# Patient Record
Sex: FEMALE | Race: WHITE | Employment: OTHER | URBAN - METROPOLITAN AREA
[De-identification: names, ages, dates, MRNs, and addresses within clinical notes are randomized per-mention and may not be internally consistent; named-entity substitution may affect disease eponyms.]

---

## 2021-05-17 ENCOUNTER — AMBULATORY - HEALTHEAST (OUTPATIENT)
Dept: GERIATRICS | Facility: CLINIC | Age: 76
End: 2021-05-17

## 2021-05-18 ENCOUNTER — OFFICE VISIT - HEALTHEAST (OUTPATIENT)
Dept: GERIATRICS | Facility: CLINIC | Age: 76
End: 2021-05-18

## 2021-05-18 ENCOUNTER — RECORDS - HEALTHEAST (OUTPATIENT)
Dept: LAB | Facility: CLINIC | Age: 76
End: 2021-05-18

## 2021-05-18 DIAGNOSIS — J96.01 ACUTE RESPIRATORY FAILURE WITH HYPOXIA (H): ICD-10-CM

## 2021-05-18 DIAGNOSIS — R33.9 URINARY RETENTION: ICD-10-CM

## 2021-05-18 DIAGNOSIS — E10.10 DIABETIC KETOACIDOSIS WITHOUT COMA ASSOCIATED WITH TYPE 1 DIABETES MELLITUS (H): ICD-10-CM

## 2021-05-18 DIAGNOSIS — N17.9 ACUTE KIDNEY INJURY (H): ICD-10-CM

## 2021-05-18 DIAGNOSIS — J69.0 ASPIRATION PNEUMONIA, UNSPECIFIED ASPIRATION PNEUMONIA TYPE, UNSPECIFIED LATERALITY, UNSPECIFIED PART OF LUNG (H): ICD-10-CM

## 2021-05-18 DIAGNOSIS — E87.20 LACTIC ACIDOSIS: ICD-10-CM

## 2021-05-19 ENCOUNTER — COMMUNICATION - HEALTHEAST (OUTPATIENT)
Dept: GERIATRICS | Facility: CLINIC | Age: 76
End: 2021-05-19

## 2021-05-19 LAB
ANION GAP SERPL CALCULATED.3IONS-SCNC: 12 MMOL/L (ref 5–18)
BNP SERPL-MCNC: 572 PG/ML (ref 0–144)
BUN SERPL-MCNC: 26 MG/DL (ref 8–28)
CALCIUM SERPL-MCNC: 9.2 MG/DL (ref 8.5–10.5)
CHLORIDE BLD-SCNC: 103 MMOL/L (ref 98–107)
CO2 SERPL-SCNC: 27 MMOL/L (ref 22–31)
CREAT SERPL-MCNC: 1.44 MG/DL (ref 0.6–1.1)
ERYTHROCYTE [DISTWIDTH] IN BLOOD BY AUTOMATED COUNT: 14.2 % (ref 11–14.5)
GFR SERPL CREATININE-BSD FRML MDRD: 35 ML/MIN/1.73M2
GLUCOSE BLD-MCNC: 146 MG/DL (ref 70–125)
HCT VFR BLD AUTO: 33.6 % (ref 35–47)
HGB BLD-MCNC: 10.7 G/DL (ref 12–16)
MCH RBC QN AUTO: 30.8 PG (ref 27–34)
MCHC RBC AUTO-ENTMCNC: 31.8 G/DL (ref 32–36)
MCV RBC AUTO: 97 FL (ref 80–100)
PLATELET # BLD AUTO: 316 THOU/UL (ref 140–440)
PMV BLD AUTO: 11.8 FL (ref 8.5–12.5)
POTASSIUM BLD-SCNC: 4.2 MMOL/L (ref 3.5–5)
RBC # BLD AUTO: 3.47 MILL/UL (ref 3.8–5.4)
SODIUM SERPL-SCNC: 142 MMOL/L (ref 136–145)
WBC: 9.5 THOU/UL (ref 4–11)

## 2021-05-21 ENCOUNTER — RECORDS - HEALTHEAST (OUTPATIENT)
Dept: LAB | Facility: CLINIC | Age: 76
End: 2021-05-21

## 2021-05-21 ENCOUNTER — OFFICE VISIT - HEALTHEAST (OUTPATIENT)
Dept: GERIATRICS | Facility: CLINIC | Age: 76
End: 2021-05-21

## 2021-05-21 DIAGNOSIS — N17.9 ACUTE KIDNEY INJURY (H): ICD-10-CM

## 2021-05-21 DIAGNOSIS — J96.01 ACUTE RESPIRATORY FAILURE WITH HYPOXIA (H): ICD-10-CM

## 2021-05-21 DIAGNOSIS — J69.0 ASPIRATION PNEUMONIA, UNSPECIFIED ASPIRATION PNEUMONIA TYPE, UNSPECIFIED LATERALITY, UNSPECIFIED PART OF LUNG (H): ICD-10-CM

## 2021-05-21 DIAGNOSIS — E87.20 LACTIC ACIDOSIS: ICD-10-CM

## 2021-05-21 DIAGNOSIS — E10.10 DIABETIC KETOACIDOSIS WITHOUT COMA ASSOCIATED WITH TYPE 1 DIABETES MELLITUS (H): ICD-10-CM

## 2021-05-21 LAB
C DIFF TOX B STL QL: POSITIVE
RIBOTYPE 027/NAP1/BI: ABNORMAL

## 2021-05-23 ENCOUNTER — RECORDS - HEALTHEAST (OUTPATIENT)
Dept: LAB | Facility: CLINIC | Age: 76
End: 2021-05-23

## 2021-05-24 LAB
ANION GAP SERPL CALCULATED.3IONS-SCNC: 11 MMOL/L (ref 5–18)
BNP SERPL-MCNC: 549 PG/ML (ref 0–144)
BUN SERPL-MCNC: 23 MG/DL (ref 8–28)
CALCIUM SERPL-MCNC: 8.4 MG/DL (ref 8.5–10.5)
CHLORIDE BLD-SCNC: 109 MMOL/L (ref 98–107)
CO2 SERPL-SCNC: 23 MMOL/L (ref 22–31)
CREAT SERPL-MCNC: 1.23 MG/DL (ref 0.6–1.1)
GFR SERPL CREATININE-BSD FRML MDRD: 42 ML/MIN/1.73M2
GLUCOSE BLD-MCNC: 104 MG/DL (ref 70–125)
POTASSIUM BLD-SCNC: 5.1 MMOL/L (ref 3.5–5)
SODIUM SERPL-SCNC: 143 MMOL/L (ref 136–145)

## 2021-05-25 ENCOUNTER — OFFICE VISIT - HEALTHEAST (OUTPATIENT)
Dept: GERIATRICS | Facility: CLINIC | Age: 76
End: 2021-05-25

## 2021-05-25 DIAGNOSIS — A09 DIARRHEA OF INFECTIOUS ORIGIN: ICD-10-CM

## 2021-05-25 DIAGNOSIS — R53.81 PHYSICAL DECONDITIONING: ICD-10-CM

## 2021-05-25 DIAGNOSIS — E13.69 OTHER SPECIFIED DIABETES MELLITUS WITH OTHER SPECIFIED COMPLICATION, WITHOUT LONG-TERM CURRENT USE OF INSULIN (H): ICD-10-CM

## 2021-05-26 ENCOUNTER — RECORDS - HEALTHEAST (OUTPATIENT)
Dept: LAB | Facility: CLINIC | Age: 76
End: 2021-05-26

## 2021-05-27 ENCOUNTER — AMBULATORY - HEALTHEAST (OUTPATIENT)
Dept: GERIATRICS | Facility: CLINIC | Age: 76
End: 2021-05-27

## 2021-05-27 ENCOUNTER — OFFICE VISIT - HEALTHEAST (OUTPATIENT)
Dept: GERIATRICS | Facility: CLINIC | Age: 76
End: 2021-05-27

## 2021-05-27 VITALS
OXYGEN SATURATION: 96 % | HEART RATE: 62 BPM | TEMPERATURE: 97.3 F | SYSTOLIC BLOOD PRESSURE: 141 MMHG | RESPIRATION RATE: 12 BRPM | DIASTOLIC BLOOD PRESSURE: 44 MMHG

## 2021-05-27 DIAGNOSIS — E13.69 OTHER SPECIFIED DIABETES MELLITUS WITH OTHER SPECIFIED COMPLICATION, WITHOUT LONG-TERM CURRENT USE OF INSULIN (H): ICD-10-CM

## 2021-05-27 DIAGNOSIS — R63.5 WEIGHT GAIN: ICD-10-CM

## 2021-05-27 DIAGNOSIS — R53.81 PHYSICAL DECONDITIONING: ICD-10-CM

## 2021-05-28 ENCOUNTER — RECORDS - HEALTHEAST (OUTPATIENT)
Dept: LAB | Facility: CLINIC | Age: 76
End: 2021-05-28

## 2021-05-28 ENCOUNTER — COMMUNICATION - HEALTHEAST (OUTPATIENT)
Dept: GERIATRICS | Facility: CLINIC | Age: 76
End: 2021-05-28

## 2021-05-28 LAB
HBA1C MFR BLD: 7.7 %
TSH SERPL DL<=0.005 MIU/L-ACNC: 2.76 UIU/ML (ref 0.3–5)

## 2021-05-29 LAB
ANION GAP SERPL CALCULATED.3IONS-SCNC: 11 MMOL/L (ref 5–18)
BUN SERPL-MCNC: 23 MG/DL (ref 8–28)
CALCIUM SERPL-MCNC: 9.2 MG/DL (ref 8.5–10.5)
CHLORIDE BLD-SCNC: 103 MMOL/L (ref 98–107)
CO2 SERPL-SCNC: 27 MMOL/L (ref 22–31)
CREAT SERPL-MCNC: 1.22 MG/DL (ref 0.6–1.1)
GFR SERPL CREATININE-BSD FRML MDRD: 43 ML/MIN/1.73M2
GLUCOSE BLD-MCNC: 105 MG/DL (ref 70–125)
POTASSIUM BLD-SCNC: 4.8 MMOL/L (ref 3.5–5)
SODIUM SERPL-SCNC: 141 MMOL/L (ref 136–145)

## 2021-06-01 ENCOUNTER — OFFICE VISIT - HEALTHEAST (OUTPATIENT)
Dept: GERIATRICS | Facility: CLINIC | Age: 76
End: 2021-06-01

## 2021-06-01 DIAGNOSIS — A09 DIARRHEA OF INFECTIOUS ORIGIN: ICD-10-CM

## 2021-06-01 DIAGNOSIS — E13.69 OTHER SPECIFIED DIABETES MELLITUS WITH OTHER SPECIFIED COMPLICATION, WITHOUT LONG-TERM CURRENT USE OF INSULIN (H): ICD-10-CM

## 2021-06-01 DIAGNOSIS — R53.81 PHYSICAL DECONDITIONING: ICD-10-CM

## 2021-06-03 ENCOUNTER — AMBULATORY - HEALTHEAST (OUTPATIENT)
Dept: GERIATRICS | Facility: CLINIC | Age: 76
End: 2021-06-03

## 2021-06-17 NOTE — PROGRESS NOTES
[facility]      Facility:    [facility] [[id number]]  Code Status: FULL CODE      Chief Complaint/Reason for Visit:  Chief Complaint   Patient presents with     H & P     Diabetes with diabetic ketoacidosis and lactic acidosis, acute kidney injury, possible upper GI bleed, acute systolic CHF with acute hypoxic respiratory failure, prolonged QTc interval, hyperlipidemia, urinary retention.       HPI:   Christi is a 75 y.o. female who is only admitted to the hospital on 5/7/2021 she had vomiting with coffee-ground emesis and she was brought in the hospital was found to have severely elevated blood sugars she does have type 1 diabetes.  She presented with diabetic ketoacidosis placed on insulin drip as well as given IV fluids.  She also had aspiration pneumonia in the hospital and sepsis due to that.  She did have new wall motion abnormalities on echocardiogram and depressed ejection fraction.  She denies any chest pain during her hospital stay.  Initial troponins were negative on admission and she did not experience any more chest pain in the hospital.  She was placed on insulin pump at this time and blood sugars did stabilize.  She did underwent diuresis with IV then switched to oral Lasix secondary to acute congestive heart failure and discharged on 20 mg of Lasix daily.  She was recommended follow-up with cardiology.  Patient was treated properly and transferred here to the TCU at [facility] in stable condition.    Patient is seen in her chair comfortably does not appear to be in acute distress.  She is legally deaf so I did communicate with her with lip reading which she did very well she understood everything I said and answered questions appropriately..  Insulin pump is working well at this time and her only complaint is she gets short of breath with any real activity.    Past Medical History:  Past Medical History:   Diagnosis Date     Acute MI (H)       CAD (coronary artery disease)      Chest pain 10/6/2017     Coronary artery disease      Deaf      Diabetes (H)      Dyslipidemia      HTN (hypertension)      Hypertension      Hypothyroidism      PDR (proliferative diabetic retinopathy) (H) 3/31/2014           Surgical History:  Past Surgical History:   Procedure Laterality Date     CARPAL TUNNEL RELEASE        SECTION       CORONARY STENT PLACEMENT       CORONARY STENT PLACEMENT         Family History:   Family History   Family history unknown: Yes       Social History:    Social History     Socioeconomic History     Marital status:      Spouse name: None     Number of children: None     Years of education: None     Highest education level: None   Occupational History     None   Social Needs     Financial resource strain: None     Food insecurity     Worry: None     Inability: None     Transportation needs     Medical: None     Non-medical: None   Tobacco Use     Smoking status: Former Smoker     Quit date: 1983     Years since quittin.4     Smokeless tobacco: Never Used   Substance and Sexual Activity     Alcohol use: Yes     Drug use: No     Sexual activity: None   Lifestyle     Physical activity     Days per week: None     Minutes per session: None     Stress: None   Relationships     Social connections     Talks on phone: None     Gets together: None     Attends Worship service: None     Active member of club or organization: None     Attends meetings of clubs or organizations: None     Relationship status: None     Intimate partner violence     Fear of current or ex partner: None     Emotionally abused: None     Physically abused: None     Forced sexual activity: None   Other Topics Concern     Incontinent Not Asked     Toileting independently Not Asked     Cognitive impairment Not Asked     Vision impairment Not Asked     Hearing impairment Not Asked     Dentures Not Asked   Social History Narrative     None           Review of Systems   Constitutional:        Positive for shortness of breath with exercise however she denies any fevers chills nausea vomit diarrhea change in vision hearing taste or smell weakness one-sided chest pain or tightness.  She denies any current shortness stool polyphagia polydipsia polyuria depression or anxiety the remainder review of systems is negative.       Vitals:    05/18/21 0955   BP: 141/44   Pulse: 62   Resp: 12   Temp: 97.3  F (36.3  C)   SpO2: 96%       Physical Exam  Constitutional:       General: She is not in acute distress.     Appearance: She is not toxic-appearing.   HENT:      Head: Normocephalic and atraumatic.      Nose: Nose normal.   Eyes:      General:         Right eye: No discharge.   Cardiovascular:      Rate and Rhythm: Normal rate and regular rhythm.   Pulmonary:      Effort: Pulmonary effort is normal. No respiratory distress.      Breath sounds: No wheezing or rales.   Abdominal:      General: Bowel sounds are normal. There is distension.      Tenderness: There is no abdominal tenderness.   Musculoskeletal:      Comments: Trace edema bilateral.   Skin:     General: Skin is warm and dry.   Neurological:      Mental Status: She is alert. Mental status is at baseline.   Psychiatric:         Mood and Affect: Mood normal.         Behavior: Behavior normal.         Medication List:  Current Outpatient Medications   Medication Sig     acetaminophen (TYLENOL) 325 MG tablet Take 650 mg by mouth every 6 (six) hours as needed for pain.     aspirin 81 mg chewable tablet Chew 81 mg daily.     atenolol (TENORMIN) 50 MG tablet Take 50 mg by mouth daily.     atorvastatin (LIPITOR) 80 MG tablet Take 80 mg by mouth at bedtime.     baclofen (LIORESAL) 10 MG tablet Take 10 mg by mouth daily as needed.     cholecalciferol, vitamin D3, 2,000 unit Tab Take 2,000 Units by mouth daily.     ezetimibe (ZETIA) 10 mg tablet Take 10 mg by mouth daily.     fluticasone propionate (FLONASE) 50  mcg/actuation nasal spray Apply 1 spray into each nostril daily.     furosemide (LASIX) 40 MG tablet Take 40 mg by mouth daily.     glucagon (BAQSIMI) 3 mg/actuation Spry 1 Dose into each nostril as needed (May repeat in 15 minutes prn. Do not open tube until ready to use).     insulin glargine (LANTUS) 100 unit/mL vial Inject 30 Units under the skin as needed (for if insulin fails).     insulin pump cartridge Crtg 1 each Inject under the skin continuous. Insulin type: Novolog     levothyroxine (SYNTHROID, LEVOTHROID) 137 MCG tablet Take 137 mcg by mouth every evening.     magnesium oxide (MAG-OX) 400 mg (241.3 mg magnesium) tablet Take 400 mg by mouth daily.     multivitamin with minerals (THERA-M) 9 mg iron-400 mcg Tab tablet Take 1 tablet by mouth daily.     omega 3-dha-epa-fish oil (FISH OIL) 1,000 mg (120 mg-180 mg) cap Take 1,000 mg by mouth daily.     omega 3-dha-epa-fish oil 1,200 (144-216) mg cap Take 1,200 mg by mouth daily.     omeprazole (PRILOSEC) 20 MG capsule Take 20 mg by mouth daily before breakfast.     polyethylene glycol (MIRALAX) 17 gram packet Take 17 g by mouth daily as needed.     polyvinyl alcohol (LIQUIFILM TEARS) 1.4 % ophthalmic solution Administer 1-2 drops to both eyes daily.     potassium bicarb-citric acid 20 mEq TbEF Take 1 tablet by mouth daily.     sertraline (ZOLOFT) 50 MG tablet Take 50 mg by mouth daily.     timoloL maleate (TIMOPTIC) 0.5 % ophthalmic solution Administer 1 drop into the left eye 2 (two) times a day.     traZODone (DESYREL) 100 MG tablet Take 100 mg by mouth at bedtime.        Labs: Blood sugars here are running high of 260 and low of 131.      Assessment:    ICD-10-CM    1. Diabetic ketoacidosis without coma associated with type 1 diabetes mellitus (H)  E10.10    2. Acute kidney injury (H)  N17.9    3. Aspiration pneumonia, unspecified aspiration pneumonia type, unspecified laterality, unspecified part of lung (H)  J69.0    4. Acute respiratory failure with  hypoxia (H)  J96.01    5. Lactic acidosis  E87.2    6. Urinary retention  R33.9        Plan: This time blood sugars were done 4 times daily basic metabolic profile CBC done tomorrow secondary to possible GI bleed that was mentioned in the hospital and basic metabolic profile tomorrow second acute kidney injury.  Will check weights daily notify the provider if any 2 pound weight changes.  We will also get brain atretic peptide tomorrow secondary to CHF and make sure she gets her trazodone 100 mg nightly for sleep.  Will incorporate speech therapy and PT OT at this time.  Monitor sleep we can always go up on her trazodone.  She did not know what dose she was on but like to 50 mg tablets were likely at home however if it was 100 mg she took 2 at home then we will I will be open to increase that to 200 mg of trazodone.    I will continue to monitor above medical problems and no other changes to care plan at this time.        Electronically signed by: Cj Pisano,

## 2021-06-17 NOTE — PROGRESS NOTES
[facility]    Facility:   [facility] [[id number]]   Code Status: FULL CODE      CHIEF COMPLAINT/REASON FOR VISIT:  Chief Complaint   Patient presents with     Problem Visit     Diabetes with diabetic ketoacidosis lactic acidosis, acute kidney injury, acute systolic CHF, prolonged QT interval, hyperlipidemia, urinary retention.       HISTORY:      HPI: Christi is a 75 y.o. female who resides here at the [facility] undergoing physical and occupational therapy and medical management secondary to hospitalization for diabetes with diabetic ketoacidosis lactic acidosis.  She also acute kidney injury in the hospital and did have an elevated brain atretic peptide.  We are reviewing her weights she was admitted at 191 now she is down to 189.6.  Creatinine is up to 144 with a GFR in the 30s.  I am unclear what her baseline is at this time.  She is no longer in ketoacidosis at this time and she does have aspiration pneumonia working with speech therapy.    Patient overall is doing well as far as her blood sugars and ketoacidosis going however she does have diarrhea and she had since antibiotics.  She also has hemorrhoids she also has some nausea which is concerning at this time.  No longer on antibiotics at this time.  She is not in any Zofran.  She claims that the diarrhea is been happening for some time likely from the antibiotics she feels however she is not been tested for C. difficile.  She does have some abdominal discomfort she is a minimizer.  She also has her hemorrhoids are acting up at this time.  She denies any urgency frequency or burning with urination chest pain or shortness of breath.    Past Medical History:   Diagnosis Date     Acute MI (H) 2005     CAD (coronary artery disease)      Chest pain 10/6/2017     Coronary artery disease      Deaf      Diabetes (H)      Dyslipidemia      HTN (hypertension)      Hypertension      Hypothyroidism      PDR  (proliferative diabetic retinopathy) (H) 3/31/2014             Family History   Family history unknown: Yes     Social History     Socioeconomic History     Marital status:      Spouse name: None     Number of children: None     Years of education: None     Highest education level: None   Occupational History     None   Social Needs     Financial resource strain: None     Food insecurity     Worry: None     Inability: None     Transportation needs     Medical: None     Non-medical: None   Tobacco Use     Smoking status: Former Smoker     Quit date: 1983     Years since quittin.4     Smokeless tobacco: Never Used   Substance and Sexual Activity     Alcohol use: Yes     Drug use: No     Sexual activity: None   Lifestyle     Physical activity     Days per week: None     Minutes per session: None     Stress: None   Relationships     Social connections     Talks on phone: None     Gets together: None     Attends Muslim service: None     Active member of club or organization: None     Attends meetings of clubs or organizations: None     Relationship status: None     Intimate partner violence     Fear of current or ex partner: None     Emotionally abused: None     Physically abused: None     Forced sexual activity: None   Other Topics Concern     Incontinent Not Asked     Toileting independently Not Asked     Cognitive impairment Not Asked     Vision impairment Not Asked     Hearing impairment Not Asked     Dentures Not Asked   Social History Narrative     None         Review of Systems   Constitutional:        Positive for abdominal discomfort and some nausea.  She also claims that she has had diarrhea for some time at this time and likely secondary to antibiotics.  She had been on antibiotics.  She denies any fevers chills vomiting chest pain or shortness of breath.  There is no depression or anxiety change in vision hearing taste or smell weakness one-sided of the.  Remainder review of systems is  negative.       Vitals:    05/21/21 0919   BP: 125/46   Pulse: (!) 50   Resp: 16   Temp: 97.1  F (36.2  C)   SpO2: 96%       Physical Exam  Constitutional:       General: She is not in acute distress.  HENT:      Head: Normocephalic and atraumatic.      Nose: Nose normal.      Mouth/Throat:      Mouth: Mucous membranes are moist.      Pharynx: Oropharynx is clear.   Eyes:      General:         Right eye: No discharge.         Left eye: No discharge.   Cardiovascular:      Rate and Rhythm: Normal rate.   Pulmonary:      Effort: Pulmonary effort is normal. No respiratory distress.      Breath sounds: No wheezing.   Abdominal:      General: There is distension.      Comments: There is slight tenderness to the epigastric area but no rebound or guarding.  Bowel sounds are positive.   Musculoskeletal:      Right lower leg: No edema.      Left lower leg: No edema.   Neurological:      Mental Status: She is alert. Mental status is at baseline.   Psychiatric:         Mood and Affect: Mood normal.         Behavior: Behavior normal.           LABS: Natruretic peptide was 570, basic metabolic profile is as follows sodium 142, potassium 4.2, chloride 103, CO2 is 27, Melecio 146, calcium was 9.2, BUN is 26, creatinine 1.44, GFR was 35.      ASSESSMENT:      ICD-10-CM    1. Diabetic ketoacidosis without coma associated with type 1 diabetes mellitus (H)  E10.10    2. Lactic acidosis  E87.2    3. Acute kidney injury (H)  N17.9    4. Aspiration pneumonia, unspecified aspiration pneumonia type, unspecified laterality, unspecified part of lung (H)  J69.0    5. Acute respiratory failure with hypoxia (H)  J96.01        PLAN: At this time brain injury peptide basic metabolic profile be done Monday showed acute kidney injury and CHF.    We will start Anusol HC cream twice daily to hemorrhoids check and check stools for C. difficile secondary to diarrhea.  Will get abdominal film flat and upright second abdominal pain and diarrhea and will  Zofran 4 mg 1 p.o. every 8 hours as needed for nausea.    I will continue to monitor above medical problems and no other changes to care plan at this time.  Total  minutes of which % was spent counseling and coordination of care of the above plan.    Electronically signed by: Cj Pisano DO

## 2021-06-17 NOTE — TELEPHONE ENCOUNTER
[facility] Nurse Triage Telephone Note      Provider: Cj Pisano DO  Facility: [facility]    Facility Type: TCU    Caller: Denise  Call Back Number:  [phone]    Allergies: Lisinopril, Metformin, and Metoprolol    Reason for call: Nurse calling to report Heme 2, BMP, and BNP results.  Notable meds:  ASA 81mg daily, Atenolol 50mg daily, Effer K 20meq daily, Lasix 20mg daily, Irbesartan 300mg daily, Omeprazole 20mg daily.       Verbal Order/Direction given by Provider: No new orders.      Provider giving order: Cj Pisano DO    Verbal order given to: Denise Canales RN

## 2021-06-17 NOTE — PROGRESS NOTES
[facility]    Facility:   [facility] [[id number]]   Code Status: FULL CODE      CHIEF COMPLAINT/REASON FOR VISIT:  Chief Complaint   Patient presents with     Review Of Multiple Medical Conditions     review VS, labs F/U diabetic ketoacidosis establish care        HISTORY:      HPI: Christi is a 75 y.o. female undergoing physical and  occupational therapy at [facility].  She is with past medical history hypertension, diabetes type 1, has an insulin pump, CAD, hypothyroidism, who presented to the hospital on 5/7/2021 with coffee-ground emesis and found to be in diabetic ketoacidosis.  She was given IV insulin along with fluids.  She was also found to have aspiration pneumonia with sepsis she has completed antibiotics.  She was also treated with IV Lasix and transition to oral Lasix secondary to congestive heart failure with recommendations to follow-up with cardiology.  BMP on 5/24/2021 was 549 which is down from 572.  Her creatinine is improving.  And last hemoglobin 10.7 on 5/19/2021.    Today she is seen to review VS, labs. She denied CP, She does have shortness of breath with activity, Denied constipation, diarrhea, nausea, cough or congestion. She does have an insulin pump. She checks her sugars 4-6 times a day and reports the staff is also checking four times a day  with their monitor.  She is currently completing a course of vancomycin for C. difficile.    Past Medical History:   Diagnosis Date     Acute MI (H) 2005     CAD (coronary artery disease)      Chest pain 10/6/2017     Coronary artery disease      Deaf      Diabetes (H)      Dyslipidemia      HTN (hypertension)      Hypertension      Hypothyroidism      PDR (proliferative diabetic retinopathy) (H) 3/31/2014             Family History   Family history unknown: Yes     Social History     Socioeconomic History     Marital status:      Spouse name: Not on file     Number of children: Not on  file     Years of education: Not on file     Highest education level: Not on file   Occupational History     Not on file   Social Needs     Financial resource strain: Not on file     Food insecurity     Worry: Not on file     Inability: Not on file     Transportation needs     Medical: Not on file     Non-medical: Not on file   Tobacco Use     Smoking status: Former Smoker     Quit date: 1983     Years since quittin.4     Smokeless tobacco: Never Used   Substance and Sexual Activity     Alcohol use: Yes     Drug use: No     Sexual activity: Not on file   Lifestyle     Physical activity     Days per week: Not on file     Minutes per session: Not on file     Stress: Not on file   Relationships     Social connections     Talks on phone: Not on file     Gets together: Not on file     Attends Faith service: Not on file     Active member of club or organization: Not on file     Attends meetings of clubs or organizations: Not on file     Relationship status: Not on file     Intimate partner violence     Fear of current or ex partner: Not on file     Emotionally abused: Not on file     Physically abused: Not on file     Forced sexual activity: Not on file   Other Topics Concern     Incontinent Not Asked     Toileting independently Not Asked     Cognitive impairment Not Asked     Vision impairment Not Asked     Hearing impairment Not Asked     Dentures Not Asked   Social History Narrative     Not on file         Review of Systems   Constitutional: Negative for activity change, appetite change, fatigue and fever.   HENT: Negative for congestion.         Deaf- uses a whiteboard    Respiratory: Negative for cough, shortness of breath and wheezing.    Cardiovascular: Negative for chest pain and leg swelling.   Gastrointestinal: Negative for abdominal distention, abdominal pain, constipation, diarrhea and nausea.   Endocrine:        Pt on an insulin pump    Genitourinary: Negative for dysuria.   Musculoskeletal:  Negative for arthralgias and back pain.   Skin: Negative for color change and wound.   Neurological: Negative for dizziness.   Psychiatric/Behavioral: Negative for agitation, behavioral problems and confusion.       Vitals:    05/25/21 0931   BP: 146/51   Pulse: 60   Resp: 16   Temp: 97.2  F (36.2  C)   SpO2: 95%   Weight: 181 lb 9.6 oz (82.4 kg)       Physical Exam  Constitutional:       Appearance: She is well-developed.      Comments: Pleasant woman in no acute distress    HENT:      Head: Normocephalic.   Eyes:      Conjunctiva/sclera: Conjunctivae normal.   Neck:      Musculoskeletal: Normal range of motion.   Cardiovascular:      Rate and Rhythm: Normal rate and regular rhythm.      Heart sounds: Normal heart sounds. No murmur.   Pulmonary:      Effort: No respiratory distress.      Breath sounds: Normal breath sounds. No wheezing or rales.   Abdominal:      General: Bowel sounds are normal. There is no distension.      Palpations: Abdomen is soft.      Tenderness: There is no abdominal tenderness.   Musculoskeletal: Normal range of motion.   Skin:     General: Skin is warm.   Neurological:      Mental Status: She is alert and oriented to person, place, and time.   Psychiatric:         Behavior: Behavior normal.           LABS:   Recent Results (from the past 240 hour(s))   Basic Metabolic Panel   Result Value Ref Range    Sodium 142 136 - 145 mmol/L    Potassium 4.2 3.5 - 5.0 mmol/L    Chloride 103 98 - 107 mmol/L    CO2 27 22 - 31 mmol/L    Anion Gap, Calculation 12 5 - 18 mmol/L    Glucose 146 (H) 70 - 125 mg/dL    Calcium 9.2 8.5 - 10.5 mg/dL    BUN 26 8 - 28 mg/dL    Creatinine 1.44 (H) 0.60 - 1.10 mg/dL    GFR MDRD Af Amer 43 (L) >60 mL/min/1.73m2    GFR MDRD Non Af Amer 35 (L) >60 mL/min/1.73m2   HM2(CBC w/o Differential)   Result Value Ref Range    WBC 9.5 4.0 - 11.0 thou/uL    RBC 3.47 (L) 3.80 - 5.40 mill/uL    Hemoglobin 10.7 (L) 12.0 - 16.0 g/dL    Hematocrit 33.6 (L) 35.0 - 47.0 %    MCV 97 80 -  100 fL    MCH 30.8 27.0 - 34.0 pg    MCHC 31.8 (L) 32.0 - 36.0 g/dL    RDW 14.2 11.0 - 14.5 %    Platelets 316 140 - 440 thou/uL    MPV 11.8 8.5 - 12.5 fL   BNP(B-type Natriuretic Peptide)   Result Value Ref Range     (H) 0 - 144 pg/mL   C. Diff Toxin By PCR    Specimen: Stool   Result Value Ref Range    C.Difficile Toxigenic by PCR Positive (!) Negative    Ribotype 027/NAP1/B1 Presumptive Negative Presumptive Negative   Basic Metabolic Panel   Result Value Ref Range    Sodium 143 136 - 145 mmol/L    Potassium 5.1 (H) 3.5 - 5.0 mmol/L    Chloride 109 (H) 98 - 107 mmol/L    CO2 23 22 - 31 mmol/L    Anion Gap, Calculation 11 5 - 18 mmol/L    Glucose 104 70 - 125 mg/dL    Calcium 8.4 (L) 8.5 - 10.5 mg/dL    BUN 23 8 - 28 mg/dL    Creatinine 1.23 (H) 0.60 - 1.10 mg/dL    GFR MDRD Af Amer 51 (L) >60 mL/min/1.73m2    GFR MDRD Non Af Amer 42 (L) >60 mL/min/1.73m2   BNP(B-type Natriuretic Peptide)   Result Value Ref Range     (H) 0 - 144 pg/mL     Current Outpatient Medications   Medication Sig     acetaminophen (TYLENOL) 325 MG tablet Take 650 mg by mouth every 6 (six) hours as needed for pain.     aspirin 81 mg chewable tablet Chew 81 mg daily.     atenolol (TENORMIN) 50 MG tablet Take 50 mg by mouth daily.     atorvastatin (LIPITOR) 80 MG tablet Take 80 mg by mouth at bedtime.     baclofen (LIORESAL) 10 MG tablet Take 10 mg by mouth daily as needed.     cholecalciferol, vitamin D3, 2,000 unit Tab Take 2,000 Units by mouth daily.     ezetimibe (ZETIA) 10 mg tablet Take 10 mg by mouth daily.     fluticasone propionate (FLONASE) 50 mcg/actuation nasal spray Apply 1 spray into each nostril daily.     furosemide (LASIX) 40 MG tablet Take 40 mg by mouth daily.     glucagon (BAQSIMI) 3 mg/actuation Spry 1 Dose into each nostril as needed (May repeat in 15 minutes prn. Do not open tube until ready to use).     hydrocortisone (ANUSOL-HC) 2.5 % rectal cream Insert into the rectum 2 (two) times a day.     insulin  glargine (LANTUS) 100 unit/mL vial Inject 30 Units under the skin as needed (for if insulin fails).     insulin pump cartridge Crtg 1 each Inject under the skin continuous. Insulin type: Novolog     levothyroxine (SYNTHROID, LEVOTHROID) 137 MCG tablet Take 137 mcg by mouth every evening.     magnesium oxide (MAG-OX) 400 mg (241.3 mg magnesium) tablet Take 400 mg by mouth daily.     multivitamin with minerals (THERA-M) 9 mg iron-400 mcg Tab tablet Take 1 tablet by mouth daily.     omega 3-dha-epa-fish oil (FISH OIL) 1,000 mg (120 mg-180 mg) cap Take 1,000 mg by mouth daily.     omega 3-dha-epa-fish oil 1,200 (144-216) mg cap Take 1,200 mg by mouth daily.     omeprazole (PRILOSEC) 20 MG capsule Take 20 mg by mouth daily before breakfast.     ondansetron (ZOFRAN) 4 MG tablet Take 4 mg by mouth every 8 (eight) hours as needed for nausea.     polyethylene glycol (MIRALAX) 17 gram packet Take 17 g by mouth daily as needed.     polyvinyl alcohol (LIQUIFILM TEARS) 1.4 % ophthalmic solution Administer 1-2 drops to both eyes daily.     potassium bicarb-citric acid 20 mEq TbEF Take 1 tablet by mouth daily.     sertraline (ZOLOFT) 50 MG tablet Take 50 mg by mouth daily.     timoloL maleate (TIMOPTIC) 0.5 % ophthalmic solution Administer 1 drop into the left eye 2 (two) times a day.     traZODone (DESYREL) 100 MG tablet Take 100 mg by mouth at bedtime.      vancomycin (VANCOCIN) 125 MG capsule Take 125 mg by mouth 4 (four) times a day.       ASSESSMENT:      ICD-10-CM    1. Other specified diabetes mellitus with other specified complication, without long-term current use of insulin (H)  E13.69    2. Diarrhea of infectious origin  A09    3. Physical deconditioning  R53.81        PLAN:    C- diff - on Vancomycin until     Insomnia- continue Trazodone    Depression on Zoloft    Diabetes- pt on an insulin pump  AIC pending     GERD continue Omeprazole     CAD- Zetia and Lipitor    Hypothyroidism- On Synthroid. Lab pending      Hypertension  on Atenolol     Electronically signed by: Amber Crenshaw, CNP

## 2021-06-21 NOTE — LETTER
Letter by Cj Pisano DO at      Author: Cj Pisano DO Service: -- Author Type: --    Filed:  Encounter Date: 5/21/2021 Status: (Other)         Patient: Christi Vazquez   MR Number: [id number]   YOB: 1945   Date of Visit: 5/21/2021     [facility]    Facility:   [facility] [[id number]]   Code Status: FULL CODE      CHIEF COMPLAINT/REASON FOR VISIT:  Chief Complaint   Patient presents with   ? Problem Visit     Diabetes with diabetic ketoacidosis lactic acidosis, acute kidney injury, acute systolic CHF, prolonged QT interval, hyperlipidemia, urinary retention.       HISTORY:      HPI: Christi is a 75 y.o. female who resides [facility] at [facility] undergoing physical and occupational therapy and medical management secondary to hospitalization for diabetes with diabetic ketoacidosis lactic acidosis.  She also acute kidney injury in the hospital and did have an elevated brain atretic peptide.  We are reviewing her weights she was admitted at 191 now she is down to 189.6.  Creatinine is up to 144 with a GFR in the 30s.  I am unclear what her baseline is at this time.  She is no longer in ketoacidosis at this time and she does have aspiration pneumonia working with speech therapy.    Patient overall is doing well as far as her blood sugars and ketoacidosis going however she does have diarrhea and she had since antibiotics.  She also has hemorrhoids she also has some nausea which is concerning at this time.  No longer on antibiotics at this time.  She is not in any Zofran.  She claims that the diarrhea is been happening for some time likely from the antibiotics she feels however she is not been tested for C. difficile.  She does have some abdominal discomfort she is a minimizer.  She also has her hemorrhoids are acting up at this time.  She denies any urgency frequency or burning with urination chest pain or shortness of breath.    Past  Medical History:   Diagnosis Date   ? Acute MI (H)    ? CAD (coronary artery disease)    ? Chest pain 10/6/2017   ? Coronary artery disease    ? Deaf    ? Diabetes (H)    ? Dyslipidemia    ? HTN (hypertension)    ? Hypertension    ? Hypothyroidism    ? PDR (proliferative diabetic retinopathy) (H) 3/31/2014             Family History   Family history unknown: Yes     Social History     Socioeconomic History   ? Marital status:      Spouse name: None   ? Number of children: None   ? Years of education: None   ? Highest education level: None   Occupational History   ? None   Social Needs   ? Financial resource strain: None   ? Food insecurity     Worry: None     Inability: None   ? Transportation needs     Medical: None     Non-medical: None   Tobacco Use   ? Smoking status: Former Smoker     Quit date: 1983     Years since quittin.4   ? Smokeless tobacco: Never Used   Substance and Sexual Activity   ? Alcohol use: Yes   ? Drug use: No   ? Sexual activity: None   Lifestyle   ? Physical activity     Days per week: None     Minutes per session: None   ? Stress: None   Relationships   ? Social connections     Talks on phone: None     Gets together: None     Attends Sabianism service: None     Active member of club or organization: None     Attends meetings of clubs or organizations: None     Relationship status: None   ? Intimate partner violence     Fear of current or ex partner: None     Emotionally abused: None     Physically abused: None     Forced sexual activity: None   Other Topics Concern   ? Incontinent Not Asked   ? Toileting independently Not Asked   ? Cognitive impairment Not Asked   ? Vision impairment Not Asked   ? Hearing impairment Not Asked   ? Dentures Not Asked   Social History Narrative   ? None         Review of Systems   Constitutional:        Positive for abdominal discomfort and some nausea.  She also claims that she has had diarrhea for some time at this time and likely  secondary to antibiotics.  She had been on antibiotics.  She denies any fevers chills vomiting chest pain or shortness of breath.  There is no depression or anxiety change in vision hearing taste or smell weakness one-sided of the.  Remainder review of systems is negative.       Vitals:    05/21/21 0919   BP: 125/46   Pulse: (!) 50   Resp: 16   Temp: 97.1  F (36.2  C)   SpO2: 96%       Physical Exam  Constitutional:       General: She is not in acute distress.  HENT:      Head: Normocephalic and atraumatic.      Nose: Nose normal.      Mouth/Throat:      Mouth: Mucous membranes are moist.      Pharynx: Oropharynx is clear.   Eyes:      General:         Right eye: No discharge.         Left eye: No discharge.   Cardiovascular:      Rate and Rhythm: Normal rate.   Pulmonary:      Effort: Pulmonary effort is normal. No respiratory distress.      Breath sounds: No wheezing.   Abdominal:      General: There is distension.      Comments: There is slight tenderness to the epigastric area but no rebound or guarding.  Bowel sounds are positive.   Musculoskeletal:      Right lower leg: No edema.      Left lower leg: No edema.   Neurological:      Mental Status: She is alert. Mental status is at baseline.   Psychiatric:         Mood and Affect: Mood normal.         Behavior: Behavior normal.           LABS: Natruretic peptide was 570, basic metabolic profile is as follows sodium 142, potassium 4.2, chloride 103, CO2 is 27, Melecio 146, calcium was 9.2, BUN is 26, creatinine 1.44, GFR was 35.      ASSESSMENT:      ICD-10-CM    1. Diabetic ketoacidosis without coma associated with type 1 diabetes mellitus (H)  E10.10    2. Lactic acidosis  E87.2    3. Acute kidney injury (H)  N17.9    4. Aspiration pneumonia, unspecified aspiration pneumonia type, unspecified laterality, unspecified part of lung (H)  J69.0    5. Acute respiratory failure with hypoxia (H)  J96.01        PLAN: At this time brain injury peptide basic metabolic  profile be done Monday showed acute kidney injury and CHF.    We will start Anusol HC cream twice daily to hemorrhoids check and check stools for C. difficile secondary to diarrhea.  Will get abdominal film flat and upright second abdominal pain and diarrhea and will Zofran 4 mg 1 p.o. every 8 hours as needed for nausea.    I will continue to monitor above medical problems and no other changes to care plan at this time.  Total  minutes of which % was spent counseling and coordination of care of the above plan.    Electronically signed by: Cj Pisano DO

## 2021-06-21 NOTE — LETTER
Letter by Cj Pisano DO at      Author: Cj Pisano DO Service: -- Author Type: --    Filed:  Encounter Date: 5/18/2021 Status: (Other)         Patient: Christi Vazquez   MR Number: [id number]   YOB: 1945   Date of Visit: 5/18/2021     [facility]      Facility:    [facility] [[id number]]  Code Status: FULL CODE      Chief Complaint/Reason for Visit:  Chief Complaint   Patient presents with   ? H & P     Diabetes with diabetic ketoacidosis and lactic acidosis, acute kidney injury, possible upper GI bleed, acute systolic CHF with acute hypoxic respiratory failure, prolonged QTc interval, hyperlipidemia, urinary retention.       HPI:   Christi is a 75 y.o. female who is only admitted to the hospital on 5/7/2021 she had vomiting with coffee-ground emesis and she was brought in the hospital was found to have severely elevated blood sugars she does have type 1 diabetes.  She presented with diabetic ketoacidosis placed on insulin drip as well as given IV fluids.  She also had aspiration pneumonia in the hospital and sepsis due to that.  She did have new wall motion abnormalities on echocardiogram and depressed ejection fraction.  She denies any chest pain during her hospital stay.  Initial troponins were negative on admission and she did not experience any more chest pain in the hospital.  She was placed on insulin pump at this time and blood sugars did stabilize.  She did underwent diuresis with IV then switched to oral Lasix secondary to acute congestive heart failure and discharged on 20 mg of Lasix daily.  She was recommended follow-up with cardiology.  Patient was treated properly and transferred here to the TCU at [facility] in stable condition.    Patient is seen in her chair comfortably does not appear to be in acute distress.  She is legally deaf so I did communicate with her with lip reading which she did very well she  understood everything I said and answered questions appropriately..  Insulin pump is working well at this time and her only complaint is she gets short of breath with any real activity.    Past Medical History:  Past Medical History:   Diagnosis Date   ? Acute MI (H)    ? CAD (coronary artery disease)    ? Chest pain 10/6/2017   ? Coronary artery disease    ? Deaf    ? Diabetes (H)    ? Dyslipidemia    ? HTN (hypertension)    ? Hypertension    ? Hypothyroidism    ? PDR (proliferative diabetic retinopathy) (H) 3/31/2014           Surgical History:  Past Surgical History:   Procedure Laterality Date   ? CARPAL TUNNEL RELEASE     ?  SECTION     ? CORONARY STENT PLACEMENT     ? CORONARY STENT PLACEMENT         Family History:   Family History   Family history unknown: Yes       Social History:    Social History     Socioeconomic History   ? Marital status:      Spouse name: None   ? Number of children: None   ? Years of education: None   ? Highest education level: None   Occupational History   ? None   Social Needs   ? Financial resource strain: None   ? Food insecurity     Worry: None     Inability: None   ? Transportation needs     Medical: None     Non-medical: None   Tobacco Use   ? Smoking status: Former Smoker     Quit date: 1983     Years since quittin.4   ? Smokeless tobacco: Never Used   Substance and Sexual Activity   ? Alcohol use: Yes   ? Drug use: No   ? Sexual activity: None   Lifestyle   ? Physical activity     Days per week: None     Minutes per session: None   ? Stress: None   Relationships   ? Social connections     Talks on phone: None     Gets together: None     Attends Je[employer]'s witness service: None     Active member of club or organization: None     Attends meetings of clubs or organizations: None     Relationship status: None   ? Intimate partner violence     Fear of current or ex partner: None     Emotionally abused: None     Physically abused: None     Forced sexual  activity: None   Other Topics Concern   ? Incontinent Not Asked   ? Toileting independently Not Asked   ? Cognitive impairment Not Asked   ? Vision impairment Not Asked   ? Hearing impairment Not Asked   ? Dentures Not Asked   Social History Narrative   ? None          Review of Systems   Constitutional:        Positive for shortness of breath with exercise however she denies any fevers chills nausea vomit diarrhea change in vision hearing taste or smell weakness one-sided chest pain or tightness.  She denies any current shortness stool polyphagia polydipsia polyuria depression or anxiety the remainder review of systems is negative.       Vitals:    05/18/21 0955   BP: 141/44   Pulse: 62   Resp: 12   Temp: 97.3  F (36.3  C)   SpO2: 96%       Physical Exam  Constitutional:       General: She is not in acute distress.     Appearance: She is not toxic-appearing.   HENT:      Head: Normocephalic and atraumatic.      Nose: Nose normal.   Eyes:      General:         Right eye: No discharge.   Cardiovascular:      Rate and Rhythm: Normal rate and regular rhythm.   Pulmonary:      Effort: Pulmonary effort is normal. No respiratory distress.      Breath sounds: No wheezing or rales.   Abdominal:      General: Bowel sounds are normal. There is distension.      Tenderness: There is no abdominal tenderness.   Musculoskeletal:      Comments: Trace edema bilateral.   Skin:     General: Skin is warm and dry.   Neurological:      Mental Status: She is alert. Mental status is at baseline.   Psychiatric:         Mood and Affect: Mood normal.         Behavior: Behavior normal.         Medication List:  Current Outpatient Medications   Medication Sig   ? acetaminophen (TYLENOL) 325 MG tablet Take 650 mg by mouth every 6 (six) hours as needed for pain.   ? aspirin 81 mg chewable tablet Chew 81 mg daily.   ? atenolol (TENORMIN) 50 MG tablet Take 50 mg by mouth daily.   ? atorvastatin (LIPITOR) 80 MG tablet Take 80 mg by mouth at bedtime.    ? baclofen (LIORESAL) 10 MG tablet Take 10 mg by mouth daily as needed.   ? cholecalciferol, vitamin D3, 2,000 unit Tab Take 2,000 Units by mouth daily.   ? ezetimibe (ZETIA) 10 mg tablet Take 10 mg by mouth daily.   ? fluticasone propionate (FLONASE) 50 mcg/actuation nasal spray Apply 1 spray into each nostril daily.   ? furosemide (LASIX) 40 MG tablet Take 40 mg by mouth daily.   ? glucagon (BAQSIMI) 3 mg/actuation Spry 1 Dose into each nostril as needed (May repeat in 15 minutes prn. Do not open tube until ready to use).   ? insulin glargine (LANTUS) 100 unit/mL vial Inject 30 Units under the skin as needed (for if insulin fails).   ? insulin pump cartridge Crtg 1 each Inject under the skin continuous. Insulin type: Novolog   ? levothyroxine (SYNTHROID, LEVOTHROID) 137 MCG tablet Take 137 mcg by mouth every evening.   ? magnesium oxide (MAG-OX) 400 mg (241.3 mg magnesium) tablet Take 400 mg by mouth daily.   ? multivitamin with minerals (THERA-M) 9 mg iron-400 mcg Tab tablet Take 1 tablet by mouth daily.   ? omega 3-dha-epa-fish oil (FISH OIL) 1,000 mg (120 mg-180 mg) cap Take 1,000 mg by mouth daily.   ? omega 3-dha-epa-fish oil 1,200 (144-216) mg cap Take 1,200 mg by mouth daily.   ? omeprazole (PRILOSEC) 20 MG capsule Take 20 mg by mouth daily before breakfast.   ? polyethylene glycol (MIRALAX) 17 gram packet Take 17 g by mouth daily as needed.   ? polyvinyl alcohol (LIQUIFILM TEARS) 1.4 % ophthalmic solution Administer 1-2 drops to both eyes daily.   ? potassium bicarb-citric acid 20 mEq TbEF Take 1 tablet by mouth daily.   ? sertraline (ZOLOFT) 50 MG tablet Take 50 mg by mouth daily.   ? timoloL maleate (TIMOPTIC) 0.5 % ophthalmic solution Administer 1 drop into the left eye 2 (two) times a day.   ? traZODone (DESYREL) 100 MG tablet Take 100 mg by mouth at bedtime.        Labs: Blood sugars here are running high of 260 and low of 131.      Assessment:    ICD-10-CM    1. Diabetic ketoacidosis without coma  associated with type 1 diabetes mellitus (H)  E10.10    2. Acute kidney injury (H)  N17.9    3. Aspiration pneumonia, unspecified aspiration pneumonia type, unspecified laterality, unspecified part of lung (H)  J69.0    4. Acute respiratory failure with hypoxia (H)  J96.01    5. Lactic acidosis  E87.2    6. Urinary retention  R33.9        Plan: This time blood sugars were done 4 times daily basic metabolic profile CBC done tomorrow secondary to possible GI bleed that was mentioned in the hospital and basic metabolic profile tomorrow second acute kidney injury.  Will check weights daily notify the provider if any 2 pound weight changes.  We will also get brain atretic peptide tomorrow secondary to CHF and make sure she gets her trazodone 100 mg nightly for sleep.  Will incorporate speech therapy and PT OT at this time.  Monitor sleep we can always go up on her trazodone.  She did not know what dose she was on but like to 50 mg tablets were likely at home however if it was 100 mg she took 2 at home then we will I will be open to increase that to 200 mg of trazodone.    I will continue to monitor above medical problems and no other changes to care plan at this time.        Electronically signed by: Cj Pisano,

## 2021-06-25 NOTE — TELEPHONE ENCOUNTER
[facility] Nurse Triage Telephone Note      Provider: ESTER Galindo  Facility: [facility]    Facility Type: TCU    Caller: Denise  Call Back Number:  [phone]    Allergies: Lisinopril, Metformin, and Metoprolol    Reason for call: Nurse calling to report TSH results.  The HgbA1c is still pending.  Patient is on Synthroid 137mcg daily.       Verbal Order/Direction given by Provider: No new orders.      Provider giving order: Cj Pisano DO    Verbal order given to: Denise Canales RN

## 2021-06-25 NOTE — PROGRESS NOTES
[facility]    Facility:   [facility] [[id number]]   Code Status: FULL CODE      CHIEF COMPLAINT/REASON FOR VISIT:  Chief Complaint   Patient presents with     Review Of Multiple Medical Conditions     review VS, labs      Discharge Summary       HISTORY:      HPI: Christi is a 75 y.o. female undergoing physical and  occupational therapy at [facility].  She is with past medical history hypertension, diabetes type 1, has an insulin pump, CAD, hypothyroidism, who presented to the hospital on 5/7/2021 with coffee-ground emesis and found to be in diabetic ketoacidosis.  She was given IV insulin along with fluids.  She was also found to have aspiration pneumonia with sepsis she has completed antibiotics.  She was also treated with IV Lasix and transition to oral Lasix secondary to congestive heart failure with recommendations to follow-up with cardiology.  BMP on 5/24/2021 was 549 which is down from 572.  Her creatinine is improving.  And last hemoglobin 10.7 on 5/19/2021.    Today she is seen to review VS, weight  fingersticks and a face-to-face for discharge.  Patient will discharge to home on 6/2/2021 with current medications and treatments.  She will have PT OT speech home health aide and RN.. She denied CP, She does have shortness of breath with activity, Denied constipation, diarrhea, nausea, cough or congestion. She does have an insulin pump. She checks her sugars 4-6 times a day and reports the staff is also checking four times a day  with their monitor.  She completed vancomycin for C. difficile.  Her weights were reviewed and she is down 2 pounds over the last week.    Past Medical History:   Diagnosis Date     Acute MI (H) 2005     CAD (coronary artery disease)      Chest pain 10/6/2017     Coronary artery disease      Deaf      Diabetes (H)      Dyslipidemia      HTN (hypertension)      Hypertension      Hypothyroidism      PDR (proliferative diabetic  retinopathy) (H) 3/31/2014             Family History   Family history unknown: Yes     Social History     Socioeconomic History     Marital status:      Spouse name: Not on file     Number of children: Not on file     Years of education: Not on file     Highest education level: Not on file   Occupational History     Not on file   Social Needs     Financial resource strain: Not on file     Food insecurity     Worry: Not on file     Inability: Not on file     Transportation needs     Medical: Not on file     Non-medical: Not on file   Tobacco Use     Smoking status: Former Smoker     Quit date: 1983     Years since quittin.4     Smokeless tobacco: Never Used   Substance and Sexual Activity     Alcohol use: Yes     Drug use: No     Sexual activity: Not on file   Lifestyle     Physical activity     Days per week: Not on file     Minutes per session: Not on file     Stress: Not on file   Relationships     Social connections     Talks on phone: Not on file     Gets together: Not on file     Attends Buddhism service: Not on file     Active member of club or organization: Not on file     Attends meetings of clubs or organizations: Not on file     Relationship status: Not on file     Intimate partner violence     Fear of current or ex partner: Not on file     Emotionally abused: Not on file     Physically abused: Not on file     Forced sexual activity: Not on file   Other Topics Concern     Incontinent Not Asked     Toileting independently Not Asked     Cognitive impairment Not Asked     Vision impairment Not Asked     Hearing impairment Not Asked     Dentures Not Asked   Social History Narrative     Not on file         Review of Systems   Constitutional: Negative for activity change, appetite change, fatigue and fever.   HENT: Negative for congestion.         Deaf- uses a whiteboard    Respiratory: Negative for cough, shortness of breath and wheezing.    Cardiovascular: Negative for chest pain and leg  swelling.   Gastrointestinal: Negative for abdominal distention, abdominal pain, constipation, diarrhea and nausea.   Endocrine:        Pt on an insulin pump    Genitourinary: Negative for dysuria.   Musculoskeletal: Negative for arthralgias and back pain.   Skin: Negative for color change and wound.   Neurological: Negative for dizziness.   Psychiatric/Behavioral: Negative for agitation, behavioral problems and confusion.       Vitals:    06/01/21 0807   BP: 127/42   Pulse: 60   Resp: 16   Temp: 97.3  F (36.3  C)   SpO2: 97%   Weight: 179 lb 9.6 oz (81.5 kg)       Physical Exam  Constitutional:       Appearance: She is well-developed.      Comments: Pleasant woman in no acute distress    HENT:      Head: Normocephalic.   Eyes:      Conjunctiva/sclera: Conjunctivae normal.   Neck:      Musculoskeletal: Normal range of motion.   Cardiovascular:      Rate and Rhythm: Normal rate and regular rhythm.      Heart sounds: Normal heart sounds. No murmur.   Pulmonary:      Effort: No respiratory distress.      Breath sounds: Normal breath sounds. No wheezing or rales.   Abdominal:      General: Bowel sounds are normal. There is no distension.      Palpations: Abdomen is soft.      Tenderness: There is no abdominal tenderness.   Musculoskeletal: Normal range of motion.   Skin:     General: Skin is warm.   Neurological:      Mental Status: She is alert and oriented to person, place, and time.   Psychiatric:         Behavior: Behavior normal.           LABS:   Recent Results (from the past 240 hour(s))   Basic Metabolic Panel   Result Value Ref Range    Sodium 143 136 - 145 mmol/L    Potassium 5.1 (H) 3.5 - 5.0 mmol/L    Chloride 109 (H) 98 - 107 mmol/L    CO2 23 22 - 31 mmol/L    Anion Gap, Calculation 11 5 - 18 mmol/L    Glucose 104 70 - 125 mg/dL    Calcium 8.4 (L) 8.5 - 10.5 mg/dL    BUN 23 8 - 28 mg/dL    Creatinine 1.23 (H) 0.60 - 1.10 mg/dL    GFR MDRD Af Amer 51 (L) >60 mL/min/1.73m2    GFR MDRD Non Af Amer 42 (L) >60  mL/min/1.73m2   BNP(B-type Natriuretic Peptide)   Result Value Ref Range     (H) 0 - 144 pg/mL   Thyroid Stimulating Hormone (TSH)   Result Value Ref Range    TSH 2.76 0.30 - 5.00 uIU/mL   Glycosylated Hemoglobin A1c   Result Value Ref Range    Hemoglobin A1c 7.7 (H) <=5.6 %   Basic Metabolic Panel   Result Value Ref Range    Sodium 141 136 - 145 mmol/L    Potassium 4.8 3.5 - 5.0 mmol/L    Chloride 103 98 - 107 mmol/L    CO2 27 22 - 31 mmol/L    Anion Gap, Calculation 11 5 - 18 mmol/L    Glucose 105 70 - 125 mg/dL    Calcium 9.2 8.5 - 10.5 mg/dL    BUN 23 8 - 28 mg/dL    Creatinine 1.22 (H) 0.60 - 1.10 mg/dL    GFR MDRD Af Amer 52 (L) >60 mL/min/1.73m2    GFR MDRD Non Af Amer 43 (L) >60 mL/min/1.73m2     Current Outpatient Medications   Medication Sig     acetaminophen (TYLENOL) 325 MG tablet Take 650 mg by mouth every 6 (six) hours as needed for pain.     aspirin 81 mg chewable tablet Chew 81 mg daily.     atenolol (TENORMIN) 50 MG tablet Take 50 mg by mouth daily.     atorvastatin (LIPITOR) 80 MG tablet Take 80 mg by mouth at bedtime.     baclofen (LIORESAL) 10 MG tablet Take 10 mg by mouth daily as needed.     cholecalciferol, vitamin D3, 2,000 unit Tab Take 2,000 Units by mouth daily.     ezetimibe (ZETIA) 10 mg tablet Take 10 mg by mouth daily.     fluticasone propionate (FLONASE) 50 mcg/actuation nasal spray Apply 1 spray into each nostril daily.     furosemide (LASIX) 40 MG tablet Take 40 mg by mouth daily.     glucagon (BAQSIMI) 3 mg/actuation Spry 1 Dose into each nostril as needed (May repeat in 15 minutes prn. Do not open tube until ready to use).     hydrocortisone (ANUSOL-HC) 2.5 % rectal cream Insert into the rectum 2 (two) times a day.     insulin glargine (LANTUS) 100 unit/mL vial Inject 30 Units under the skin as needed (for if insulin fails).     insulin pump cartridge Crtg 1 each Inject under the skin continuous. Insulin type: Novolog     irbesartan (AVAPRO) 300 MG tablet Take 300 mg by  mouth daily.     levothyroxine (SYNTHROID, LEVOTHROID) 137 MCG tablet Take 137 mcg by mouth every evening.     magnesium oxide (MAG-OX) 400 mg (241.3 mg magnesium) tablet Take 400 mg by mouth daily.     multivitamin with minerals (THERA-M) 9 mg iron-400 mcg Tab tablet Take 1 tablet by mouth daily.     omega 3-dha-epa-fish oil (FISH OIL) 1,000 mg (120 mg-180 mg) cap Take 1,000 mg by mouth daily.     omega 3-dha-epa-fish oil 1,200 (144-216) mg cap Take 1,200 mg by mouth daily.     omeprazole (PRILOSEC) 20 MG capsule Take 20 mg by mouth daily before breakfast.     ondansetron (ZOFRAN) 4 MG tablet Take 4 mg by mouth every 8 (eight) hours as needed for nausea.     polyethylene glycol (MIRALAX) 17 gram packet Take 17 g by mouth daily as needed.     polyvinyl alcohol (LIQUIFILM TEARS) 1.4 % ophthalmic solution Administer 1-2 drops to both eyes daily.     potassium bicarb-citric acid 20 mEq TbEF Take 1 tablet by mouth daily.     sertraline (ZOLOFT) 50 MG tablet Take 50 mg by mouth daily.     timoloL maleate (TIMOPTIC) 0.5 % ophthalmic solution Administer 1 drop into the left eye 2 (two) times a day.     traZODone (DESYREL) 100 MG tablet Take 100 mg by mouth at bedtime.        ASSESSMENT:      ICD-10-CM    1. Physical deconditioning  R53.81    2. Diarrhea of infectious origin  A09    3. Other specified diabetes mellitus with other specified complication, without long-term current use of insulin (H)  E13.69        PLAN:    C- diff -completed vancomycin    Insomnia- continue Trazodone    Depression on Zoloft    Diabetes- pt on an insulin pump  AIC on 528 was 7.7.    GERD continue Omeprazole     CAD- Zetia and Lipitor    Hypothyroidism- On Synthroid. Lab pending     Hypertension  on Atenolol    DISCHARGE PLAN/FACE TO FACE:  I certify that this patient is under my care and that I, or a nurse practitioner or physician's assistant working with me, had a face-to-face encounter that meets the physician face-to-face encounter  requirements with this patient.   Date of Face-to-Face Encounter: 6/1/2021    I certify that, based on my findings, the following services are medically necessary home health services: PT OT home health aide RN and speech therapy    My clinical findings support the need for the above skilled services because: PT OT for continued strength and endurance, home health aide to assist with activities of daily living, RN for vital signs, weight management and monitoring of medications.    This patient is homebound because: She is deconditioned and easily fatigued following recent hospitalization for diabetic ketoacidosis.    The patient is, or has been, under my care and I have initiated the establishment of the plan of care. This patient will be followed by a physician who will periodically review the plan of care.       Electronically signed by: Amber Crenshaw CNP

## 2021-06-25 NOTE — PROGRESS NOTES
[facility]    Facility:   [facility] [[id number]]   Code Status: FULL CODE      CHIEF COMPLAINT/REASON FOR VISIT:  Chief Complaint   Patient presents with     Review Of Multiple Medical Conditions     Review vital signs, blood sugars weight       HISTORY:      HPI: Christi is a 75 y.o. female undergoing physical and  occupational therapy at [facility].  She is with past medical history hypertension, diabetes type 1, has an insulin pump, CAD, hypothyroidism, who presented to the hospital on 5/7/2021 with coffee-ground emesis and found to be in diabetic ketoacidosis.  She was given IV insulin along with fluids.  She was also found to have aspiration pneumonia with sepsis she has completed antibiotics.  She was also treated with IV Lasix and transition to oral Lasix secondary to congestive heart failure with recommendations to follow-up with cardiology.  BMP on 5/24/2021 was 549 which is down from 572.  Her creatinine is improving.  And last hemoglobin 10.7 on 5/19/2021.    Today she is seen to review VS, weight and fingersticks.  Patient showed a weight gain of 5 pounds between 5/25 and 5/26.  Her weight was checked today and she is back to 181.9 up only 1 pound from the 25th.  She had no lower extremity edema and slight crackles left lower lobe she denied CP, She does have shortness of breath with activity, Denied constipation, diarrhea, nausea, cough or congestion. She does have an insulin pump. She checks her sugars 4-6 times a day and reports the staff is also checking four times a day  with their monitor.  She is currently completing a course of vancomycin for C. difficile.  Patient tells writer that she slept very good last night.    Past Medical History:   Diagnosis Date     Acute MI (H) 2005     CAD (coronary artery disease)      Chest pain 10/6/2017     Coronary artery disease      Deaf      Diabetes (H)      Dyslipidemia      HTN (hypertension)       Hypertension      Hypothyroidism      PDR (proliferative diabetic retinopathy) (H) 3/31/2014             Family History   Family history unknown: Yes     Social History     Socioeconomic History     Marital status:      Spouse name: Not on file     Number of children: Not on file     Years of education: Not on file     Highest education level: Not on file   Occupational History     Not on file   Social Needs     Financial resource strain: Not on file     Food insecurity     Worry: Not on file     Inability: Not on file     Transportation needs     Medical: Not on file     Non-medical: Not on file   Tobacco Use     Smoking status: Former Smoker     Quit date: 1983     Years since quittin.4     Smokeless tobacco: Never Used   Substance and Sexual Activity     Alcohol use: Yes     Drug use: No     Sexual activity: Not on file   Lifestyle     Physical activity     Days per week: Not on file     Minutes per session: Not on file     Stress: Not on file   Relationships     Social connections     Talks on phone: Not on file     Gets together: Not on file     Attends Christianity service: Not on file     Active member of club or organization: Not on file     Attends meetings of clubs or organizations: Not on file     Relationship status: Not on file     Intimate partner violence     Fear of current or ex partner: Not on file     Emotionally abused: Not on file     Physically abused: Not on file     Forced sexual activity: Not on file   Other Topics Concern     Incontinent Not Asked     Toileting independently Not Asked     Cognitive impairment Not Asked     Vision impairment Not Asked     Hearing impairment Not Asked     Dentures Not Asked   Social History Narrative     Not on file         Review of Systems   Constitutional: Negative for activity change, appetite change, fatigue and fever.   HENT: Negative for congestion.         Deaf- uses a whiteboard    Respiratory: Negative for cough, shortness of breath and  wheezing.    Cardiovascular: Negative for chest pain and leg swelling.   Gastrointestinal: Negative for abdominal distention, abdominal pain, constipation, diarrhea and nausea.   Endocrine:        Pt on an insulin pump    Genitourinary: Negative for dysuria.   Musculoskeletal: Negative for arthralgias and back pain.   Skin: Negative for color change and wound.   Neurological: Negative for dizziness.   Psychiatric/Behavioral: Negative for agitation, behavioral problems and confusion.       Vitals:    05/27/21 0938   BP: 138/58   Pulse: (!) 55   Resp: 14   Temp: 97.4  F (36.3  C)   SpO2: 94%   Weight: 184 lb 9.6 oz (83.7 kg)       Physical Exam  Constitutional:       Appearance: She is well-developed.      Comments: Pleasant woman in no acute distress    HENT:      Head: Normocephalic.   Eyes:      Conjunctiva/sclera: Conjunctivae normal.   Neck:      Musculoskeletal: Normal range of motion.   Cardiovascular:      Rate and Rhythm: Normal rate and regular rhythm.      Heart sounds: Normal heart sounds. No murmur.   Pulmonary:      Effort: No respiratory distress.      Breath sounds: Normal breath sounds. No wheezing or rales.   Abdominal:      General: Bowel sounds are normal. There is no distension.      Palpations: Abdomen is soft.      Tenderness: There is no abdominal tenderness.   Musculoskeletal: Normal range of motion.   Skin:     General: Skin is warm.   Neurological:      Mental Status: She is alert and oriented to person, place, and time.   Psychiatric:         Behavior: Behavior normal.           LABS:   Recent Results (from the past 240 hour(s))   Basic Metabolic Panel   Result Value Ref Range    Sodium 142 136 - 145 mmol/L    Potassium 4.2 3.5 - 5.0 mmol/L    Chloride 103 98 - 107 mmol/L    CO2 27 22 - 31 mmol/L    Anion Gap, Calculation 12 5 - 18 mmol/L    Glucose 146 (H) 70 - 125 mg/dL    Calcium 9.2 8.5 - 10.5 mg/dL    BUN 26 8 - 28 mg/dL    Creatinine 1.44 (H) 0.60 - 1.10 mg/dL    GFR MDRD Af Amer 43  (L) >60 mL/min/1.73m2    GFR MDRD Non Af Amer 35 (L) >60 mL/min/1.73m2   HM2(CBC w/o Differential)   Result Value Ref Range    WBC 9.5 4.0 - 11.0 thou/uL    RBC 3.47 (L) 3.80 - 5.40 mill/uL    Hemoglobin 10.7 (L) 12.0 - 16.0 g/dL    Hematocrit 33.6 (L) 35.0 - 47.0 %    MCV 97 80 - 100 fL    MCH 30.8 27.0 - 34.0 pg    MCHC 31.8 (L) 32.0 - 36.0 g/dL    RDW 14.2 11.0 - 14.5 %    Platelets 316 140 - 440 thou/uL    MPV 11.8 8.5 - 12.5 fL   BNP(B-type Natriuretic Peptide)   Result Value Ref Range     (H) 0 - 144 pg/mL   C. Diff Toxin By PCR    Specimen: Stool   Result Value Ref Range    C.Difficile Toxigenic by PCR Positive (!) Negative    Ribotype 027/NAP1/B1 Presumptive Negative Presumptive Negative   Basic Metabolic Panel   Result Value Ref Range    Sodium 143 136 - 145 mmol/L    Potassium 5.1 (H) 3.5 - 5.0 mmol/L    Chloride 109 (H) 98 - 107 mmol/L    CO2 23 22 - 31 mmol/L    Anion Gap, Calculation 11 5 - 18 mmol/L    Glucose 104 70 - 125 mg/dL    Calcium 8.4 (L) 8.5 - 10.5 mg/dL    BUN 23 8 - 28 mg/dL    Creatinine 1.23 (H) 0.60 - 1.10 mg/dL    GFR MDRD Af Amer 51 (L) >60 mL/min/1.73m2    GFR MDRD Non Af Amer 42 (L) >60 mL/min/1.73m2   BNP(B-type Natriuretic Peptide)   Result Value Ref Range     (H) 0 - 144 pg/mL     Current Outpatient Medications   Medication Sig     acetaminophen (TYLENOL) 325 MG tablet Take 650 mg by mouth every 6 (six) hours as needed for pain.     aspirin 81 mg chewable tablet Chew 81 mg daily.     atenolol (TENORMIN) 50 MG tablet Take 50 mg by mouth daily.     atorvastatin (LIPITOR) 80 MG tablet Take 80 mg by mouth at bedtime.     baclofen (LIORESAL) 10 MG tablet Take 10 mg by mouth daily as needed.     cholecalciferol, vitamin D3, 2,000 unit Tab Take 2,000 Units by mouth daily.     ezetimibe (ZETIA) 10 mg tablet Take 10 mg by mouth daily.     fluticasone propionate (FLONASE) 50 mcg/actuation nasal spray Apply 1 spray into each nostril daily.     furosemide (LASIX) 40 MG tablet  Take 40 mg by mouth daily.     glucagon (BAQSIMI) 3 mg/actuation Spry 1 Dose into each nostril as needed (May repeat in 15 minutes prn. Do not open tube until ready to use).     hydrocortisone (ANUSOL-HC) 2.5 % rectal cream Insert into the rectum 2 (two) times a day.     insulin glargine (LANTUS) 100 unit/mL vial Inject 30 Units under the skin as needed (for if insulin fails).     insulin pump cartridge Crtg 1 each Inject under the skin continuous. Insulin type: Novolog     levothyroxine (SYNTHROID, LEVOTHROID) 137 MCG tablet Take 137 mcg by mouth every evening.     magnesium oxide (MAG-OX) 400 mg (241.3 mg magnesium) tablet Take 400 mg by mouth daily.     multivitamin with minerals (THERA-M) 9 mg iron-400 mcg Tab tablet Take 1 tablet by mouth daily.     omega 3-dha-epa-fish oil (FISH OIL) 1,000 mg (120 mg-180 mg) cap Take 1,000 mg by mouth daily.     omega 3-dha-epa-fish oil 1,200 (144-216) mg cap Take 1,200 mg by mouth daily.     omeprazole (PRILOSEC) 20 MG capsule Take 20 mg by mouth daily before breakfast.     ondansetron (ZOFRAN) 4 MG tablet Take 4 mg by mouth every 8 (eight) hours as needed for nausea.     polyethylene glycol (MIRALAX) 17 gram packet Take 17 g by mouth daily as needed.     polyvinyl alcohol (LIQUIFILM TEARS) 1.4 % ophthalmic solution Administer 1-2 drops to both eyes daily.     potassium bicarb-citric acid 20 mEq TbEF Take 1 tablet by mouth daily.     sertraline (ZOLOFT) 50 MG tablet Take 50 mg by mouth daily.     timoloL maleate (TIMOPTIC) 0.5 % ophthalmic solution Administer 1 drop into the left eye 2 (two) times a day.     traZODone (DESYREL) 100 MG tablet Take 100 mg by mouth at bedtime.      vancomycin (VANCOCIN) 125 MG capsule Take 125 mg by mouth 4 (four) times a day.       ASSESSMENT:      ICD-10-CM    1. Weight gain  R63.5    2. Other specified diabetes mellitus with other specified complication, without long-term current use of insulin (H)  E13.69    3. Physical deconditioning   R53.81        PLAN:    C- diff - on Vancomycin until     Insomnia- continue Trazodone    Depression on Zoloft    Diabetes- pt on an insulin pump  AIC pending     GERD continue Omeprazole     CAD- Zetia and Lipitor    Hypothyroidism- On Synthroid. Lab pending     Hypertension  on Atenolol     Electronically signed by: Amber Crenshaw CNP

## 2021-07-04 NOTE — LETTER
Letter by Amber Crenshaw CNP at      Author: Amber Crenshaw CNP Service: -- Author Type: --    Filed:  Encounter Date: 5/25/2021 Status: (Other)         Patient: Christi Vazquez   MR Number: [id number]   YOB: 1945   Date of Visit: 5/25/2021     [facility]    Facility:   [facility] [[id number]]   Code Status: FULL CODE      CHIEF COMPLAINT/REASON FOR VISIT:  Chief Complaint   Patient presents with   ? Review Of Multiple Medical Conditions     review VS, labs F/U diabetic ketoacidosis establish care        HISTORY:      HPI: Christi is a 75 y.o. female undergoing physical and  occupational therapy at [facility].  She is with past medical history hypertension, diabetes type 1, has an insulin pump, CAD, hypothyroidism, who presented to the hospital on 5/7/2021 with coffee-ground emesis and found to be in diabetic ketoacidosis.  She was given IV insulin along with fluids.  She was also found to have aspiration pneumonia with sepsis she has completed antibiotics.  She was also treated with IV Lasix and transition to oral Lasix secondary to congestive heart failure with recommendations to follow-up with cardiology.  BMP on 5/24/2021 was 549 which is down from 572.  Her creatinine is improving.  And last hemoglobin 10.7 on 5/19/2021.    Today she is seen to review VS, labs. She denied CP, She does have shortness of breath with activity, Denied constipation, diarrhea, nausea, cough or congestion. She does have an insulin pump. She checks her sugars 4-6 times a day and reports the staff is also checking four times a day  with their monitor.  She is currently completing a course of vancomycin for C. difficile.    Past Medical History:   Diagnosis Date   ? Acute MI (H) 2005   ? CAD (coronary artery disease)    ? Chest pain 10/6/2017   ? Coronary artery disease    ? Deaf    ? Diabetes (H)    ? Dyslipidemia    ? HTN (hypertension)    ? Hypertension    ?  Hypothyroidism    ? PDR (proliferative diabetic retinopathy) (H) 3/31/2014             Family History   Family history unknown: Yes     Social History     Socioeconomic History   ? Marital status:      Spouse name: Not on file   ? Number of children: Not on file   ? Years of education: Not on file   ? Highest education level: Not on file   Occupational History   ? Not on file   Social Needs   ? Financial resource strain: Not on file   ? Food insecurity     Worry: Not on file     Inability: Not on file   ? Transportation needs     Medical: Not on file     Non-medical: Not on file   Tobacco Use   ? Smoking status: Former Smoker     Quit date: 1983     Years since quittin.4   ? Smokeless tobacco: Never Used   Substance and Sexual Activity   ? Alcohol use: Yes   ? Drug use: No   ? Sexual activity: Not on file   Lifestyle   ? Physical activity     Days per week: Not on file     Minutes per session: Not on file   ? Stress: Not on file   Relationships   ? Social connections     Talks on phone: Not on file     Gets together: Not on file     Attends Restorationism service: Not on file     Active member of club or organization: Not on file     Attends meetings of clubs or organizations: Not on file     Relationship status: Not on file   ? Intimate partner violence     Fear of current or ex partner: Not on file     Emotionally abused: Not on file     Physically abused: Not on file     Forced sexual activity: Not on file   Other Topics Concern   ? Incontinent Not Asked   ? Toileting independently Not Asked   ? Cognitive impairment Not Asked   ? Vision impairment Not Asked   ? Hearing impairment Not Asked   ? Dentures Not Asked   Social History Narrative   ? Not on file         Review of Systems   Constitutional: Negative for activity change, appetite change, fatigue and fever.   HENT: Negative for congestion.         Deaf- uses a whiteboard    Respiratory: Negative for cough, shortness of breath and wheezing.     Cardiovascular: Negative for chest pain and leg swelling.   Gastrointestinal: Negative for abdominal distention, abdominal pain, constipation, diarrhea and nausea.   Endocrine:        Pt on an insulin pump    Genitourinary: Negative for dysuria.   Musculoskeletal: Negative for arthralgias and back pain.   Skin: Negative for color change and wound.   Neurological: Negative for dizziness.   Psychiatric/Behavioral: Negative for agitation, behavioral problems and confusion.       Vitals:    05/25/21 0931   BP: 146/51   Pulse: 60   Resp: 16   Temp: 97.2  F (36.2  C)   SpO2: 95%   Weight: 181 lb 9.6 oz (82.4 kg)       Physical Exam  Constitutional:       Appearance: She is well-developed.      Comments: Pleasant woman in no acute distress    HENT:      Head: Normocephalic.   Eyes:      Conjunctiva/sclera: Conjunctivae normal.   Neck:      Musculoskeletal: Normal range of motion.   Cardiovascular:      Rate and Rhythm: Normal rate and regular rhythm.      Heart sounds: Normal heart sounds. No murmur.   Pulmonary:      Effort: No respiratory distress.      Breath sounds: Normal breath sounds. No wheezing or rales.   Abdominal:      General: Bowel sounds are normal. There is no distension.      Palpations: Abdomen is soft.      Tenderness: There is no abdominal tenderness.   Musculoskeletal: Normal range of motion.   Skin:     General: Skin is warm.   Neurological:      Mental Status: She is alert and oriented to person, place, and time.   Psychiatric:         Behavior: Behavior normal.           LABS:   Recent Results (from the past 240 hour(s))   Basic Metabolic Panel   Result Value Ref Range    Sodium 142 136 - 145 mmol/L    Potassium 4.2 3.5 - 5.0 mmol/L    Chloride 103 98 - 107 mmol/L    CO2 27 22 - 31 mmol/L    Anion Gap, Calculation 12 5 - 18 mmol/L    Glucose 146 (H) 70 - 125 mg/dL    Calcium 9.2 8.5 - 10.5 mg/dL    BUN 26 8 - 28 mg/dL    Creatinine 1.44 (H) 0.60 - 1.10 mg/dL    GFR MDRD Af Amer 43 (L) >60  mL/min/1.73m2    GFR MDRD Non Af Amer 35 (L) >60 mL/min/1.73m2   HM2(CBC w/o Differential)   Result Value Ref Range    WBC 9.5 4.0 - 11.0 thou/uL    RBC 3.47 (L) 3.80 - 5.40 mill/uL    Hemoglobin 10.7 (L) 12.0 - 16.0 g/dL    Hematocrit 33.6 (L) 35.0 - 47.0 %    MCV 97 80 - 100 fL    MCH 30.8 27.0 - 34.0 pg    MCHC 31.8 (L) 32.0 - 36.0 g/dL    RDW 14.2 11.0 - 14.5 %    Platelets 316 140 - 440 thou/uL    MPV 11.8 8.5 - 12.5 fL   BNP(B-type Natriuretic Peptide)   Result Value Ref Range     (H) 0 - 144 pg/mL   C. Diff Toxin By PCR    Specimen: Stool   Result Value Ref Range    C.Difficile Toxigenic by PCR Positive (!) Negative    Ribotype 027/NAP1/B1 Presumptive Negative Presumptive Negative   Basic Metabolic Panel   Result Value Ref Range    Sodium 143 136 - 145 mmol/L    Potassium 5.1 (H) 3.5 - 5.0 mmol/L    Chloride 109 (H) 98 - 107 mmol/L    CO2 23 22 - 31 mmol/L    Anion Gap, Calculation 11 5 - 18 mmol/L    Glucose 104 70 - 125 mg/dL    Calcium 8.4 (L) 8.5 - 10.5 mg/dL    BUN 23 8 - 28 mg/dL    Creatinine 1.23 (H) 0.60 - 1.10 mg/dL    GFR MDRD Af Amer 51 (L) >60 mL/min/1.73m2    GFR MDRD Non Af Amer 42 (L) >60 mL/min/1.73m2   BNP(B-type Natriuretic Peptide)   Result Value Ref Range     (H) 0 - 144 pg/mL     Current Outpatient Medications   Medication Sig   ? acetaminophen (TYLENOL) 325 MG tablet Take 650 mg by mouth every 6 (six) hours as needed for pain.   ? aspirin 81 mg chewable tablet Chew 81 mg daily.   ? atenolol (TENORMIN) 50 MG tablet Take 50 mg by mouth daily.   ? atorvastatin (LIPITOR) 80 MG tablet Take 80 mg by mouth at bedtime.   ? baclofen (LIORESAL) 10 MG tablet Take 10 mg by mouth daily as needed.   ? cholecalciferol, vitamin D3, 2,000 unit Tab Take 2,000 Units by mouth daily.   ? ezetimibe (ZETIA) 10 mg tablet Take 10 mg by mouth daily.   ? fluticasone propionate (FLONASE) 50 mcg/actuation nasal spray Apply 1 spray into each nostril daily.   ? furosemide (LASIX) 40 MG tablet Take 40 mg  by mouth daily.   ? glucagon (BAQSIMI) 3 mg/actuation Spry 1 Dose into each nostril as needed (May repeat in 15 minutes prn. Do not open tube until ready to use).   ? hydrocortisone (ANUSOL-HC) 2.5 % rectal cream Insert into the rectum 2 (two) times a day.   ? insulin glargine (LANTUS) 100 unit/mL vial Inject 30 Units under the skin as needed (for if insulin fails).   ? insulin pump cartridge Crtg 1 each Inject under the skin continuous. Insulin type: Novolog   ? levothyroxine (SYNTHROID, LEVOTHROID) 137 MCG tablet Take 137 mcg by mouth every evening.   ? magnesium oxide (MAG-OX) 400 mg (241.3 mg magnesium) tablet Take 400 mg by mouth daily.   ? multivitamin with minerals (THERA-M) 9 mg iron-400 mcg Tab tablet Take 1 tablet by mouth daily.   ? omega 3-dha-epa-fish oil (FISH OIL) 1,000 mg (120 mg-180 mg) cap Take 1,000 mg by mouth daily.   ? omega 3-dha-epa-fish oil 1,200 (144-216) mg cap Take 1,200 mg by mouth daily.   ? omeprazole (PRILOSEC) 20 MG capsule Take 20 mg by mouth daily before breakfast.   ? ondansetron (ZOFRAN) 4 MG tablet Take 4 mg by mouth every 8 (eight) hours as needed for nausea.   ? polyethylene glycol (MIRALAX) 17 gram packet Take 17 g by mouth daily as needed.   ? polyvinyl alcohol (LIQUIFILM TEARS) 1.4 % ophthalmic solution Administer 1-2 drops to both eyes daily.   ? potassium bicarb-citric acid 20 mEq TbEF Take 1 tablet by mouth daily.   ? sertraline (ZOLOFT) 50 MG tablet Take 50 mg by mouth daily.   ? timoloL maleate (TIMOPTIC) 0.5 % ophthalmic solution Administer 1 drop into the left eye 2 (two) times a day.   ? traZODone (DESYREL) 100 MG tablet Take 100 mg by mouth at bedtime.    ? vancomycin (VANCOCIN) 125 MG capsule Take 125 mg by mouth 4 (four) times a day.       ASSESSMENT:      ICD-10-CM    1. Other specified diabetes mellitus with other specified complication, without long-term current use of insulin (H)  E13.69    2. Diarrhea of infectious origin  A09    3. Physical deconditioning   R53.81        PLAN:    C- diff - on Vancomycin until     Insomnia- continue Trazodone    Depression on Zoloft    Diabetes- pt on an insulin pump  AIC pending     GERD continue Omeprazole     CAD- Zetia and Lipitor    Hypothyroidism- On Synthroid. Lab pending     Hypertension  on Atenolol     Electronically signed by: Amber Crenshaw CNP

## 2021-07-04 NOTE — LETTER
Letter by Amber Crenshaw CNP at      Author: Amber Crenshaw CNP Service: -- Author Type: --    Filed:  Encounter Date: 6/1/2021 Status: (Other)         Patient: Christi Vazquez   MR Number: [id number]   YOB: 1945   Date of Visit: 6/1/2021     [facility]    Facility:   [facility] [[id number]]   Code Status: FULL CODE      CHIEF COMPLAINT/REASON FOR VISIT:  Chief Complaint   Patient presents with   ? Review Of Multiple Medical Conditions     review VS, labs    ? Discharge Summary       HISTORY:      HPI: Christi is a 75 y.o. female undergoing physical and  occupational therapy at [facility].  She is with past medical history hypertension, diabetes type 1, has an insulin pump, CAD, hypothyroidism, who presented to the hospital on 5/7/2021 with coffee-ground emesis and found to be in diabetic ketoacidosis.  She was given IV insulin along with fluids.  She was also found to have aspiration pneumonia with sepsis she has completed antibiotics.  She was also treated with IV Lasix and transition to oral Lasix secondary to congestive heart failure with recommendations to follow-up with cardiology.  BMP on 5/24/2021 was 549 which is down from 572.  Her creatinine is improving.  And last hemoglobin 10.7 on 5/19/2021.    Today she is seen to review VS, weight  fingersticks and a face-to-face for discharge.  Patient will discharge to home on 6/2/2021 with current medications and treatments.  She will have PT OT speech home health aide and RN.. She denied CP, She does have shortness of breath with activity, Denied constipation, diarrhea, nausea, cough or congestion. She does have an insulin pump. She checks her sugars 4-6 times a day and reports the staff is also checking four times a day  with their monitor.  She completed vancomycin for C. difficile.  Her weights were reviewed and she is down 2 pounds over the last week.    Past Medical History:   Diagnosis  Date   ? Acute MI (H)    ? CAD (coronary artery disease)    ? Chest pain 10/6/2017   ? Coronary artery disease    ? Deaf    ? Diabetes (H)    ? Dyslipidemia    ? HTN (hypertension)    ? Hypertension    ? Hypothyroidism    ? PDR (proliferative diabetic retinopathy) (H) 3/31/2014             Family History   Family history unknown: Yes     Social History     Socioeconomic History   ? Marital status:      Spouse name: Not on file   ? Number of children: Not on file   ? Years of education: Not on file   ? Highest education level: Not on file   Occupational History   ? Not on file   Social Needs   ? Financial resource strain: Not on file   ? Food insecurity     Worry: Not on file     Inability: Not on file   ? Transportation needs     Medical: Not on file     Non-medical: Not on file   Tobacco Use   ? Smoking status: Former Smoker     Quit date: 1983     Years since quittin.4   ? Smokeless tobacco: Never Used   Substance and Sexual Activity   ? Alcohol use: Yes   ? Drug use: No   ? Sexual activity: Not on file   Lifestyle   ? Physical activity     Days per week: Not on file     Minutes per session: Not on file   ? Stress: Not on file   Relationships   ? Social connections     Talks on phone: Not on file     Gets together: Not on file     Attends [employer] service: Not on file     Active member of club or organization: Not on file     Attends meetings of clubs or organizations: Not on file     Relationship status: Not on file   ? Intimate partner violence     Fear of current or ex partner: Not on file     Emotionally abused: Not on file     Physically abused: Not on file     Forced sexual activity: Not on file   Other Topics Concern   ? Incontinent Not Asked   ? Toileting independently Not Asked   ? Cognitive impairment Not Asked   ? Vision impairment Not Asked   ? Hearing impairment Not Asked   ? Dentures Not Asked   Social History Narrative   ? Not on file         Review of Systems   Constitutional:  Negative for activity change, appetite change, fatigue and fever.   HENT: Negative for congestion.         Deaf- uses a whiteboard    Respiratory: Negative for cough, shortness of breath and wheezing.    Cardiovascular: Negative for chest pain and leg swelling.   Gastrointestinal: Negative for abdominal distention, abdominal pain, constipation, diarrhea and nausea.   Endocrine:        Pt on an insulin pump    Genitourinary: Negative for dysuria.   Musculoskeletal: Negative for arthralgias and back pain.   Skin: Negative for color change and wound.   Neurological: Negative for dizziness.   Psychiatric/Behavioral: Negative for agitation, behavioral problems and confusion.       Vitals:    06/01/21 0807   BP: 127/42   Pulse: 60   Resp: 16   Temp: 97.3  F (36.3  C)   SpO2: 97%   Weight: 179 lb 9.6 oz (81.5 kg)       Physical Exam  Constitutional:       Appearance: She is well-developed.      Comments: Pleasant woman in no acute distress    HENT:      Head: Normocephalic.   Eyes:      Conjunctiva/sclera: Conjunctivae normal.   Neck:      Musculoskeletal: Normal range of motion.   Cardiovascular:      Rate and Rhythm: Normal rate and regular rhythm.      Heart sounds: Normal heart sounds. No murmur.   Pulmonary:      Effort: No respiratory distress.      Breath sounds: Normal breath sounds. No wheezing or rales.   Abdominal:      General: Bowel sounds are normal. There is no distension.      Palpations: Abdomen is soft.      Tenderness: There is no abdominal tenderness.   Musculoskeletal: Normal range of motion.   Skin:     General: Skin is warm.   Neurological:      Mental Status: She is alert and oriented to person, place, and time.   Psychiatric:         Behavior: Behavior normal.           LABS:   Recent Results (from the past 240 hour(s))   Basic Metabolic Panel   Result Value Ref Range    Sodium 143 136 - 145 mmol/L    Potassium 5.1 (H) 3.5 - 5.0 mmol/L    Chloride 109 (H) 98 - 107 mmol/L    CO2 23 22 - 31 mmol/L     Anion Gap, Calculation 11 5 - 18 mmol/L    Glucose 104 70 - 125 mg/dL    Calcium 8.4 (L) 8.5 - 10.5 mg/dL    BUN 23 8 - 28 mg/dL    Creatinine 1.23 (H) 0.60 - 1.10 mg/dL    GFR MDRD Af Amer 51 (L) >60 mL/min/1.73m2    GFR MDRD Non Af Amer 42 (L) >60 mL/min/1.73m2   BNP(B-type Natriuretic Peptide)   Result Value Ref Range     (H) 0 - 144 pg/mL   Thyroid Stimulating Hormone (TSH)   Result Value Ref Range    TSH 2.76 0.30 - 5.00 uIU/mL   Glycosylated Hemoglobin A1c   Result Value Ref Range    Hemoglobin A1c 7.7 (H) <=5.6 %   Basic Metabolic Panel   Result Value Ref Range    Sodium 141 136 - 145 mmol/L    Potassium 4.8 3.5 - 5.0 mmol/L    Chloride 103 98 - 107 mmol/L    CO2 27 22 - 31 mmol/L    Anion Gap, Calculation 11 5 - 18 mmol/L    Glucose 105 70 - 125 mg/dL    Calcium 9.2 8.5 - 10.5 mg/dL    BUN 23 8 - 28 mg/dL    Creatinine 1.22 (H) 0.60 - 1.10 mg/dL    GFR MDRD Af Amer 52 (L) >60 mL/min/1.73m2    GFR MDRD Non Af Amer 43 (L) >60 mL/min/1.73m2     Current Outpatient Medications   Medication Sig   ? acetaminophen (TYLENOL) 325 MG tablet Take 650 mg by mouth every 6 (six) hours as needed for pain.   ? aspirin 81 mg chewable tablet Chew 81 mg daily.   ? atenolol (TENORMIN) 50 MG tablet Take 50 mg by mouth daily.   ? atorvastatin (LIPITOR) 80 MG tablet Take 80 mg by mouth at bedtime.   ? baclofen (LIORESAL) 10 MG tablet Take 10 mg by mouth daily as needed.   ? cholecalciferol, vitamin D3, 2,000 unit Tab Take 2,000 Units by mouth daily.   ? ezetimibe (ZETIA) 10 mg tablet Take 10 mg by mouth daily.   ? fluticasone propionate (FLONASE) 50 mcg/actuation nasal spray Apply 1 spray into each nostril daily.   ? furosemide (LASIX) 40 MG tablet Take 40 mg by mouth daily.   ? glucagon (BAQSIMI) 3 mg/actuation Spry 1 Dose into each nostril as needed (May repeat in 15 minutes prn. Do not open tube until ready to use).   ? hydrocortisone (ANUSOL-HC) 2.5 % rectal cream Insert into the rectum 2 (two) times a day.   ? insulin  glargine (LANTUS) 100 unit/mL vial Inject 30 Units under the skin as needed (for if insulin fails).   ? insulin pump cartridge Crtg 1 each Inject under the skin continuous. Insulin type: Novolog   ? irbesartan (AVAPRO) 300 MG tablet Take 300 mg by mouth daily.   ? levothyroxine (SYNTHROID, LEVOTHROID) 137 MCG tablet Take 137 mcg by mouth every evening.   ? magnesium oxide (MAG-OX) 400 mg (241.3 mg magnesium) tablet Take 400 mg by mouth daily.   ? multivitamin with minerals (THERA-M) 9 mg iron-400 mcg Tab tablet Take 1 tablet by mouth daily.   ? omega 3-dha-epa-fish oil (FISH OIL) 1,000 mg (120 mg-180 mg) cap Take 1,000 mg by mouth daily.   ? omega 3-dha-epa-fish oil 1,200 (144-216) mg cap Take 1,200 mg by mouth daily.   ? omeprazole (PRILOSEC) 20 MG capsule Take 20 mg by mouth daily before breakfast.   ? ondansetron (ZOFRAN) 4 MG tablet Take 4 mg by mouth every 8 (eight) hours as needed for nausea.   ? polyethylene glycol (MIRALAX) 17 gram packet Take 17 g by mouth daily as needed.   ? polyvinyl alcohol (LIQUIFILM TEARS) 1.4 % ophthalmic solution Administer 1-2 drops to both eyes daily.   ? potassium bicarb-citric acid 20 mEq TbEF Take 1 tablet by mouth daily.   ? sertraline (ZOLOFT) 50 MG tablet Take 50 mg by mouth daily.   ? timoloL maleate (TIMOPTIC) 0.5 % ophthalmic solution Administer 1 drop into the left eye 2 (two) times a day.   ? traZODone (DESYREL) 100 MG tablet Take 100 mg by mouth at bedtime.        ASSESSMENT:      ICD-10-CM    1. Physical deconditioning  R53.81    2. Diarrhea of infectious origin  A09    3. Other specified diabetes mellitus with other specified complication, without long-term current use of insulin (H)  E13.69        PLAN:    C- diff -completed vancomycin    Insomnia- continue Trazodone    Depression on Zoloft    Diabetes- pt on an insulin pump  AIC on 528 was 7.7.    GERD continue Omeprazole     CAD- Zetia and Lipitor    Hypothyroidism- On Synthroid. Lab pending     Hypertension  on  Atenolol    DISCHARGE PLAN/FACE TO FACE:  I certify that this patient is under my care and that I, or a nurse practitioner or physician's assistant working with me, had a face-to-face encounter that meets the physician face-to-face encounter requirements with this patient.   Date of Face-to-Face Encounter: 6/1/2021    I certify that, based on my findings, the following services are medically necessary home health services: PT OT home health aide RN and speech therapy    My clinical findings support the need for the above skilled services because: PT OT for continued strength and endurance, home health aide to assist with activities of daily living, RN for vital signs, weight management and monitoring of medications.    This patient is homebound because: She is deconditioned and easily fatigued following recent hospitalization for diabetic ketoacidosis.    The patient is, or has been, under my care and I have initiated the establishment of the plan of care. This patient will be followed by a physician who will periodically review the plan of care.       Electronically signed by: Amber Crenshaw CNP

## 2021-07-04 NOTE — LETTER
Letter by Amber Crenshaw CNP at      Author: Amber Crenshaw CNP Service: -- Author Type: --    Filed:  Encounter Date: 5/27/2021 Status: (Other)         Patient: Christi Vazquez   MR Number: [id number]   YOB: 1945   Date of Visit: 5/27/2021     [facility]    Facility:   [facility] [[id number]]   Code Status: FULL CODE      CHIEF COMPLAINT/REASON FOR VISIT:  Chief Complaint   Patient presents with   ? Review Of Multiple Medical Conditions     Review vital signs, blood sugars weight       HISTORY:      HPI: Christi is a 75 y.o. female undergoing physical and  occupational therapy at [facility].  She is with past medical history hypertension, diabetes type 1, has an insulin pump, CAD, hypothyroidism, who presented to the hospital on 5/7/2021 with coffee-ground emesis and found to be in diabetic ketoacidosis.  She was given IV insulin along with fluids.  She was also found to have aspiration pneumonia with sepsis she has completed antibiotics.  She was also treated with IV Lasix and transition to oral Lasix secondary to congestive heart failure with recommendations to follow-up with cardiology.  BMP on 5/24/2021 was 549 which is down from 572.  Her creatinine is improving.  And last hemoglobin 10.7 on 5/19/2021.    Today she is seen to review VS, weight and fingersticks.  Patient showed a weight gain of 5 pounds between 5/25 and 5/26.  Her weight was checked today and she is back to 181.9 up only 1 pound from the 25th.  She had no lower extremity edema and slight crackles left lower lobe she denied CP, She does have shortness of breath with activity, Denied constipation, diarrhea, nausea, cough or congestion. She does have an insulin pump. She checks her sugars 4-6 times a day and reports the staff is also checking four times a day  with their monitor.  She is currently completing a course of vancomycin for C. difficile.  Patient tells writer that she  slept very good last night.    Past Medical History:   Diagnosis Date   ? Acute MI (H)    ? CAD (coronary artery disease)    ? Chest pain 10/6/2017   ? Coronary artery disease    ? Deaf    ? Diabetes (H)    ? Dyslipidemia    ? HTN (hypertension)    ? Hypertension    ? Hypothyroidism    ? PDR (proliferative diabetic retinopathy) (H) 3/31/2014             Family History   Family history unknown: Yes     Social History     Socioeconomic History   ? Marital status:      Spouse name: Not on file   ? Number of children: Not on file   ? Years of education: Not on file   ? Highest education level: Not on file   Occupational History   ? Not on file   Social Needs   ? Financial resource strain: Not on file   ? Food insecurity     Worry: Not on file     Inability: Not on file   ? Transportation needs     Medical: Not on file     Non-medical: Not on file   Tobacco Use   ? Smoking status: Former Smoker     Quit date: 1983     Years since quittin.4   ? Smokeless tobacco: Never Used   Substance and Sexual Activity   ? Alcohol use: Yes   ? Drug use: No   ? Sexual activity: Not on file   Lifestyle   ? Physical activity     Days per week: Not on file     Minutes per session: Not on file   ? Stress: Not on file   Relationships   ? Social connections     Talks on phone: Not on file     Gets together: Not on file     Attends [employer] service: Not on file     Active member of club or organization: Not on file     Attends meetings of clubs or organizations: Not on file     Relationship status: Not on file   ? Intimate partner violence     Fear of current or ex partner: Not on file     Emotionally abused: Not on file     Physically abused: Not on file     Forced sexual activity: Not on file   Other Topics Concern   ? Incontinent Not Asked   ? Toileting independently Not Asked   ? Cognitive impairment Not Asked   ? Vision impairment Not Asked   ? Hearing impairment Not Asked   ? Dentures Not Asked   Social History  Narrative   ? Not on file         Review of Systems   Constitutional: Negative for activity change, appetite change, fatigue and fever.   HENT: Negative for congestion.         Deaf- uses a whiteboard    Respiratory: Negative for cough, shortness of breath and wheezing.    Cardiovascular: Negative for chest pain and leg swelling.   Gastrointestinal: Negative for abdominal distention, abdominal pain, constipation, diarrhea and nausea.   Endocrine:        Pt on an insulin pump    Genitourinary: Negative for dysuria.   Musculoskeletal: Negative for arthralgias and back pain.   Skin: Negative for color change and wound.   Neurological: Negative for dizziness.   Psychiatric/Behavioral: Negative for agitation, behavioral problems and confusion.       Vitals:    05/27/21 0938   BP: 138/58   Pulse: (!) 55   Resp: 14   Temp: 97.4  F (36.3  C)   SpO2: 94%   Weight: 184 lb 9.6 oz (83.7 kg)       Physical Exam  Constitutional:       Appearance: She is well-developed.      Comments: Pleasant woman in no acute distress    HENT:      Head: Normocephalic.   Eyes:      Conjunctiva/sclera: Conjunctivae normal.   Neck:      Musculoskeletal: Normal range of motion.   Cardiovascular:      Rate and Rhythm: Normal rate and regular rhythm.      Heart sounds: Normal heart sounds. No murmur.   Pulmonary:      Effort: No respiratory distress.      Breath sounds: Normal breath sounds. No wheezing or rales.   Abdominal:      General: Bowel sounds are normal. There is no distension.      Palpations: Abdomen is soft.      Tenderness: There is no abdominal tenderness.   Musculoskeletal: Normal range of motion.   Skin:     General: Skin is warm.   Neurological:      Mental Status: She is alert and oriented to person, place, and time.   Psychiatric:         Behavior: Behavior normal.           LABS:   Recent Results (from the past 240 hour(s))   Basic Metabolic Panel   Result Value Ref Range    Sodium 142 136 - 145 mmol/L    Potassium 4.2 3.5 - 5.0  mmol/L    Chloride 103 98 - 107 mmol/L    CO2 27 22 - 31 mmol/L    Anion Gap, Calculation 12 5 - 18 mmol/L    Glucose 146 (H) 70 - 125 mg/dL    Calcium 9.2 8.5 - 10.5 mg/dL    BUN 26 8 - 28 mg/dL    Creatinine 1.44 (H) 0.60 - 1.10 mg/dL    GFR MDRD Af Amer 43 (L) >60 mL/min/1.73m2    GFR MDRD Non Af Amer 35 (L) >60 mL/min/1.73m2   HM2(CBC w/o Differential)   Result Value Ref Range    WBC 9.5 4.0 - 11.0 thou/uL    RBC 3.47 (L) 3.80 - 5.40 mill/uL    Hemoglobin 10.7 (L) 12.0 - 16.0 g/dL    Hematocrit 33.6 (L) 35.0 - 47.0 %    MCV 97 80 - 100 fL    MCH 30.8 27.0 - 34.0 pg    MCHC 31.8 (L) 32.0 - 36.0 g/dL    RDW 14.2 11.0 - 14.5 %    Platelets 316 140 - 440 thou/uL    MPV 11.8 8.5 - 12.5 fL   BNP(B-type Natriuretic Peptide)   Result Value Ref Range     (H) 0 - 144 pg/mL   C. Diff Toxin By PCR    Specimen: Stool   Result Value Ref Range    C.Difficile Toxigenic by PCR Positive (!) Negative    Ribotype 027/NAP1/B1 Presumptive Negative Presumptive Negative   Basic Metabolic Panel   Result Value Ref Range    Sodium 143 136 - 145 mmol/L    Potassium 5.1 (H) 3.5 - 5.0 mmol/L    Chloride 109 (H) 98 - 107 mmol/L    CO2 23 22 - 31 mmol/L    Anion Gap, Calculation 11 5 - 18 mmol/L    Glucose 104 70 - 125 mg/dL    Calcium 8.4 (L) 8.5 - 10.5 mg/dL    BUN 23 8 - 28 mg/dL    Creatinine 1.23 (H) 0.60 - 1.10 mg/dL    GFR MDRD Af Amer 51 (L) >60 mL/min/1.73m2    GFR MDRD Non Af Amer 42 (L) >60 mL/min/1.73m2   BNP(B-type Natriuretic Peptide)   Result Value Ref Range     (H) 0 - 144 pg/mL     Current Outpatient Medications   Medication Sig   ? acetaminophen (TYLENOL) 325 MG tablet Take 650 mg by mouth every 6 (six) hours as needed for pain.   ? aspirin 81 mg chewable tablet Chew 81 mg daily.   ? atenolol (TENORMIN) 50 MG tablet Take 50 mg by mouth daily.   ? atorvastatin (LIPITOR) 80 MG tablet Take 80 mg by mouth at bedtime.   ? baclofen (LIORESAL) 10 MG tablet Take 10 mg by mouth daily as needed.   ? cholecalciferol,  vitamin D3, 2,000 unit Tab Take 2,000 Units by mouth daily.   ? ezetimibe (ZETIA) 10 mg tablet Take 10 mg by mouth daily.   ? fluticasone propionate (FLONASE) 50 mcg/actuation nasal spray Apply 1 spray into each nostril daily.   ? furosemide (LASIX) 40 MG tablet Take 40 mg by mouth daily.   ? glucagon (BAQSIMI) 3 mg/actuation Spry 1 Dose into each nostril as needed (May repeat in 15 minutes prn. Do not open tube until ready to use).   ? hydrocortisone (ANUSOL-HC) 2.5 % rectal cream Insert into the rectum 2 (two) times a day.   ? insulin glargine (LANTUS) 100 unit/mL vial Inject 30 Units under the skin as needed (for if insulin fails).   ? insulin pump cartridge Crtg 1 each Inject under the skin continuous. Insulin type: Novolog   ? levothyroxine (SYNTHROID, LEVOTHROID) 137 MCG tablet Take 137 mcg by mouth every evening.   ? magnesium oxide (MAG-OX) 400 mg (241.3 mg magnesium) tablet Take 400 mg by mouth daily.   ? multivitamin with minerals (THERA-M) 9 mg iron-400 mcg Tab tablet Take 1 tablet by mouth daily.   ? omega 3-dha-epa-fish oil (FISH OIL) 1,000 mg (120 mg-180 mg) cap Take 1,000 mg by mouth daily.   ? omega 3-dha-epa-fish oil 1,200 (144-216) mg cap Take 1,200 mg by mouth daily.   ? omeprazole (PRILOSEC) 20 MG capsule Take 20 mg by mouth daily before breakfast.   ? ondansetron (ZOFRAN) 4 MG tablet Take 4 mg by mouth every 8 (eight) hours as needed for nausea.   ? polyethylene glycol (MIRALAX) 17 gram packet Take 17 g by mouth daily as needed.   ? polyvinyl alcohol (LIQUIFILM TEARS) 1.4 % ophthalmic solution Administer 1-2 drops to both eyes daily.   ? potassium bicarb-citric acid 20 mEq TbEF Take 1 tablet by mouth daily.   ? sertraline (ZOLOFT) 50 MG tablet Take 50 mg by mouth daily.   ? timoloL maleate (TIMOPTIC) 0.5 % ophthalmic solution Administer 1 drop into the left eye 2 (two) times a day.   ? traZODone (DESYREL) 100 MG tablet Take 100 mg by mouth at bedtime.    ? vancomycin (VANCOCIN) 125 MG capsule  Take 125 mg by mouth 4 (four) times a day.       ASSESSMENT:      ICD-10-CM    1. Weight gain  R63.5    2. Other specified diabetes mellitus with other specified complication, without long-term current use of insulin (H)  E13.69    3. Physical deconditioning  R53.81        PLAN:    C- diff - on Vancomycin until     Insomnia- continue Trazodone    Depression on Zoloft    Diabetes- pt on an insulin pump  AIC pending     GERD continue Omeprazole     CAD- Zetia and Lipitor    Hypothyroidism- On Synthroid. Lab pending     Hypertension  on Atenolol     Electronically signed by: Amber Crenshaw CNP

## 2021-07-06 VITALS
WEIGHT: 184.6 LBS | TEMPERATURE: 97.4 F | SYSTOLIC BLOOD PRESSURE: 138 MMHG | OXYGEN SATURATION: 94 % | RESPIRATION RATE: 14 BRPM | DIASTOLIC BLOOD PRESSURE: 58 MMHG | BODY MASS INDEX: 30.72 KG/M2 | HEART RATE: 55 BPM

## 2021-07-06 VITALS
BODY MASS INDEX: 30.22 KG/M2 | OXYGEN SATURATION: 95 % | SYSTOLIC BLOOD PRESSURE: 146 MMHG | DIASTOLIC BLOOD PRESSURE: 51 MMHG | RESPIRATION RATE: 16 BRPM | WEIGHT: 181.6 LBS | HEART RATE: 60 BPM | TEMPERATURE: 97.2 F

## 2021-07-06 VITALS
RESPIRATION RATE: 16 BRPM | HEART RATE: 60 BPM | DIASTOLIC BLOOD PRESSURE: 42 MMHG | BODY MASS INDEX: 29.89 KG/M2 | TEMPERATURE: 97.3 F | OXYGEN SATURATION: 97 % | WEIGHT: 179.6 LBS | SYSTOLIC BLOOD PRESSURE: 127 MMHG

## 2021-07-06 VITALS
HEART RATE: 50 BPM | DIASTOLIC BLOOD PRESSURE: 46 MMHG | OXYGEN SATURATION: 96 % | SYSTOLIC BLOOD PRESSURE: 125 MMHG | RESPIRATION RATE: 16 BRPM | TEMPERATURE: 97.1 F

## 2022-01-03 ENCOUNTER — TRANSCRIBE ORDERS (OUTPATIENT)
Dept: OTHER | Age: 77
End: 2022-01-03

## 2022-01-03 DIAGNOSIS — Z95.1 S/P CABG (CORONARY ARTERY BYPASS GRAFT): Primary | ICD-10-CM

## 2022-01-06 ENCOUNTER — TRANSCRIBE ORDERS (OUTPATIENT)
Dept: OTHER | Age: 77
End: 2022-01-06

## 2022-01-11 ENCOUNTER — HOSPITAL ENCOUNTER (OUTPATIENT)
Dept: CARDIAC REHAB | Facility: CLINIC | Age: 77
End: 2022-01-11
Attending: THORACIC SURGERY (CARDIOTHORACIC VASCULAR SURGERY)
Payer: MEDICARE

## 2022-01-11 PROCEDURE — 93798 PHYS/QHP OP CAR RHAB W/ECG: CPT

## 2022-01-11 PROCEDURE — 93797 PHYS/QHP OP CAR RHAB WO ECG: CPT | Mod: 59

## 2022-02-04 ENCOUNTER — HOSPITAL ENCOUNTER (OUTPATIENT)
Dept: CARDIAC REHAB | Facility: CLINIC | Age: 77
End: 2022-02-04
Attending: INTERNAL MEDICINE
Payer: MEDICARE

## 2022-02-04 PROCEDURE — 93798 PHYS/QHP OP CAR RHAB W/ECG: CPT

## 2022-02-09 ENCOUNTER — HOSPITAL ENCOUNTER (OUTPATIENT)
Dept: CARDIAC REHAB | Facility: CLINIC | Age: 77
End: 2022-02-09
Attending: INTERNAL MEDICINE
Payer: MEDICARE

## 2022-02-09 PROCEDURE — 93798 PHYS/QHP OP CAR RHAB W/ECG: CPT

## 2022-02-11 ENCOUNTER — HOSPITAL ENCOUNTER (OUTPATIENT)
Dept: CARDIAC REHAB | Facility: CLINIC | Age: 77
End: 2022-02-11
Attending: INTERNAL MEDICINE
Payer: MEDICARE

## 2022-02-11 PROCEDURE — 93798 PHYS/QHP OP CAR RHAB W/ECG: CPT

## 2022-02-16 ENCOUNTER — HOSPITAL ENCOUNTER (OUTPATIENT)
Dept: CARDIAC REHAB | Facility: CLINIC | Age: 77
End: 2022-02-16
Attending: INTERNAL MEDICINE
Payer: MEDICARE

## 2022-02-16 PROCEDURE — 93798 PHYS/QHP OP CAR RHAB W/ECG: CPT

## 2022-02-18 ENCOUNTER — HOSPITAL ENCOUNTER (OUTPATIENT)
Dept: CARDIAC REHAB | Facility: CLINIC | Age: 77
End: 2022-02-18
Attending: INTERNAL MEDICINE
Payer: MEDICARE

## 2022-02-18 PROCEDURE — 93798 PHYS/QHP OP CAR RHAB W/ECG: CPT

## 2022-02-25 ENCOUNTER — HOSPITAL ENCOUNTER (OUTPATIENT)
Dept: CARDIAC REHAB | Facility: CLINIC | Age: 77
End: 2022-02-25
Attending: INTERNAL MEDICINE
Payer: MEDICARE

## 2022-02-25 PROCEDURE — 93798 PHYS/QHP OP CAR RHAB W/ECG: CPT

## 2022-03-02 ENCOUNTER — HOSPITAL ENCOUNTER (OUTPATIENT)
Dept: CARDIAC REHAB | Facility: CLINIC | Age: 77
End: 2022-03-02
Attending: INTERNAL MEDICINE
Payer: MEDICARE

## 2022-03-02 PROCEDURE — 93798 PHYS/QHP OP CAR RHAB W/ECG: CPT

## 2022-03-04 ENCOUNTER — HOSPITAL ENCOUNTER (OUTPATIENT)
Dept: CARDIAC REHAB | Facility: CLINIC | Age: 77
End: 2022-03-04
Attending: INTERNAL MEDICINE
Payer: MEDICARE

## 2022-03-04 PROCEDURE — 93798 PHYS/QHP OP CAR RHAB W/ECG: CPT

## 2022-03-09 ENCOUNTER — HOSPITAL ENCOUNTER (OUTPATIENT)
Dept: CARDIAC REHAB | Facility: CLINIC | Age: 77
End: 2022-03-09
Attending: INTERNAL MEDICINE
Payer: MEDICARE

## 2022-03-09 PROCEDURE — 93798 PHYS/QHP OP CAR RHAB W/ECG: CPT

## 2022-03-11 ENCOUNTER — HOSPITAL ENCOUNTER (OUTPATIENT)
Dept: CARDIAC REHAB | Facility: CLINIC | Age: 77
Discharge: HOME | End: 2022-03-11
Attending: INTERNAL MEDICINE
Payer: MEDICARE

## 2022-03-11 PROCEDURE — 93798 PHYS/QHP OP CAR RHAB W/ECG: CPT

## 2022-03-18 ENCOUNTER — HOSPITAL ENCOUNTER (OUTPATIENT)
Dept: CARDIAC REHAB | Facility: CLINIC | Age: 77
Discharge: HOME | End: 2022-03-18
Attending: INTERNAL MEDICINE
Payer: MEDICARE

## 2022-03-18 PROCEDURE — 93798 PHYS/QHP OP CAR RHAB W/ECG: CPT | Performed by: OCCUPATIONAL THERAPIST

## 2022-03-23 ENCOUNTER — HOSPITAL ENCOUNTER (OUTPATIENT)
Dept: CARDIAC REHAB | Facility: CLINIC | Age: 77
Discharge: HOME | End: 2022-03-23
Attending: INTERNAL MEDICINE
Payer: MEDICARE

## 2022-03-23 PROCEDURE — 93798 PHYS/QHP OP CAR RHAB W/ECG: CPT

## 2022-03-25 ENCOUNTER — HOSPITAL ENCOUNTER (OUTPATIENT)
Dept: CARDIAC REHAB | Facility: CLINIC | Age: 77
Discharge: HOME | End: 2022-03-25
Attending: INTERNAL MEDICINE
Payer: MEDICARE

## 2022-03-25 PROCEDURE — 93798 PHYS/QHP OP CAR RHAB W/ECG: CPT

## 2022-03-30 ENCOUNTER — HOSPITAL ENCOUNTER (OUTPATIENT)
Dept: CARDIAC REHAB | Facility: CLINIC | Age: 77
Discharge: HOME | End: 2022-03-30
Attending: INTERNAL MEDICINE
Payer: MEDICARE

## 2022-03-30 PROCEDURE — 93798 PHYS/QHP OP CAR RHAB W/ECG: CPT

## 2022-04-01 ENCOUNTER — HOSPITAL ENCOUNTER (OUTPATIENT)
Dept: CARDIAC REHAB | Facility: CLINIC | Age: 77
Discharge: HOME | End: 2022-04-01
Attending: INTERNAL MEDICINE
Payer: MEDICARE

## 2022-04-01 PROCEDURE — 93798 PHYS/QHP OP CAR RHAB W/ECG: CPT

## 2022-04-06 ENCOUNTER — HOSPITAL ENCOUNTER (OUTPATIENT)
Dept: CARDIAC REHAB | Facility: CLINIC | Age: 77
Discharge: HOME | End: 2022-04-06
Attending: INTERNAL MEDICINE
Payer: MEDICARE

## 2022-04-06 PROCEDURE — 93798 PHYS/QHP OP CAR RHAB W/ECG: CPT

## 2022-04-08 ENCOUNTER — HOSPITAL ENCOUNTER (OUTPATIENT)
Dept: CARDIAC REHAB | Facility: CLINIC | Age: 77
Discharge: HOME | End: 2022-04-08
Attending: INTERNAL MEDICINE
Payer: MEDICARE

## 2022-04-08 PROCEDURE — 93798 PHYS/QHP OP CAR RHAB W/ECG: CPT

## 2022-04-13 ENCOUNTER — HOSPITAL ENCOUNTER (OUTPATIENT)
Dept: CARDIAC REHAB | Facility: CLINIC | Age: 77
Discharge: HOME | End: 2022-04-13
Attending: INTERNAL MEDICINE
Payer: MEDICARE

## 2022-04-13 PROCEDURE — 93797 PHYS/QHP OP CAR RHAB WO ECG: CPT

## 2022-04-13 PROCEDURE — 93798 PHYS/QHP OP CAR RHAB W/ECG: CPT

## 2022-04-15 ENCOUNTER — HOSPITAL ENCOUNTER (OUTPATIENT)
Dept: CARDIAC REHAB | Facility: CLINIC | Age: 77
Discharge: HOME | End: 2022-04-15
Attending: INTERNAL MEDICINE
Payer: MEDICARE

## 2022-04-15 PROCEDURE — 93798 PHYS/QHP OP CAR RHAB W/ECG: CPT

## 2022-04-20 ENCOUNTER — HOSPITAL ENCOUNTER (OUTPATIENT)
Dept: CARDIAC REHAB | Facility: CLINIC | Age: 77
Discharge: HOME | End: 2022-04-20
Attending: INTERNAL MEDICINE
Payer: MEDICARE

## 2022-04-20 PROCEDURE — 93798 PHYS/QHP OP CAR RHAB W/ECG: CPT

## 2022-04-22 ENCOUNTER — HOSPITAL ENCOUNTER (OUTPATIENT)
Dept: CARDIAC REHAB | Facility: CLINIC | Age: 77
Discharge: HOME | End: 2022-04-22
Attending: INTERNAL MEDICINE
Payer: MEDICARE

## 2022-04-22 PROCEDURE — 93798 PHYS/QHP OP CAR RHAB W/ECG: CPT

## 2022-04-27 ENCOUNTER — HOSPITAL ENCOUNTER (OUTPATIENT)
Dept: CARDIAC REHAB | Facility: CLINIC | Age: 77
Discharge: HOME | End: 2022-04-27
Attending: INTERNAL MEDICINE
Payer: MEDICARE

## 2022-04-27 PROCEDURE — 93798 PHYS/QHP OP CAR RHAB W/ECG: CPT

## 2022-04-29 ENCOUNTER — HOSPITAL ENCOUNTER (OUTPATIENT)
Dept: CARDIAC REHAB | Facility: CLINIC | Age: 77
Discharge: HOME | End: 2022-04-29
Attending: INTERNAL MEDICINE
Payer: MEDICARE

## 2022-04-29 PROCEDURE — 93798 PHYS/QHP OP CAR RHAB W/ECG: CPT

## 2022-05-04 ENCOUNTER — HOSPITAL ENCOUNTER (OUTPATIENT)
Dept: CARDIAC REHAB | Facility: CLINIC | Age: 77
Discharge: HOME | End: 2022-05-04
Attending: INTERNAL MEDICINE
Payer: MEDICARE

## 2022-05-04 PROCEDURE — 93798 PHYS/QHP OP CAR RHAB W/ECG: CPT

## 2022-05-06 ENCOUNTER — HOSPITAL ENCOUNTER (OUTPATIENT)
Dept: CARDIAC REHAB | Facility: CLINIC | Age: 77
Discharge: HOME | End: 2022-05-06
Attending: INTERNAL MEDICINE
Payer: MEDICARE

## 2022-05-06 PROCEDURE — 93798 PHYS/QHP OP CAR RHAB W/ECG: CPT

## 2022-05-11 ENCOUNTER — HOSPITAL ENCOUNTER (OUTPATIENT)
Dept: CARDIAC REHAB | Facility: CLINIC | Age: 77
Discharge: HOME | End: 2022-05-11
Attending: INTERNAL MEDICINE
Payer: MEDICARE

## 2022-05-11 PROCEDURE — 93798 PHYS/QHP OP CAR RHAB W/ECG: CPT

## 2022-05-13 ENCOUNTER — HOSPITAL ENCOUNTER (OUTPATIENT)
Dept: CARDIAC REHAB | Facility: CLINIC | Age: 77
Discharge: HOME | End: 2022-05-13
Attending: INTERNAL MEDICINE
Payer: MEDICARE

## 2022-05-13 PROCEDURE — 93798 PHYS/QHP OP CAR RHAB W/ECG: CPT

## 2022-05-18 ENCOUNTER — HOSPITAL ENCOUNTER (OUTPATIENT)
Dept: CARDIAC REHAB | Facility: CLINIC | Age: 77
Discharge: HOME | End: 2022-05-18
Attending: INTERNAL MEDICINE
Payer: MEDICARE

## 2022-05-18 PROCEDURE — 93798 PHYS/QHP OP CAR RHAB W/ECG: CPT

## 2022-05-20 ENCOUNTER — HOSPITAL ENCOUNTER (OUTPATIENT)
Dept: CARDIAC REHAB | Facility: CLINIC | Age: 77
Discharge: HOME | End: 2022-05-20
Attending: INTERNAL MEDICINE
Payer: MEDICARE

## 2022-05-20 PROCEDURE — 93798 PHYS/QHP OP CAR RHAB W/ECG: CPT

## 2022-05-25 ENCOUNTER — HOSPITAL ENCOUNTER (OUTPATIENT)
Dept: CARDIAC REHAB | Facility: CLINIC | Age: 77
Discharge: HOME | End: 2022-05-25
Attending: INTERNAL MEDICINE
Payer: MEDICARE

## 2022-05-25 PROCEDURE — 93798 PHYS/QHP OP CAR RHAB W/ECG: CPT

## 2022-05-31 ENCOUNTER — HOSPITAL ENCOUNTER (OUTPATIENT)
Dept: CARDIAC REHAB | Facility: CLINIC | Age: 77
Discharge: HOME | End: 2022-05-31
Attending: INTERNAL MEDICINE
Payer: MEDICARE

## 2022-05-31 PROCEDURE — 93798 PHYS/QHP OP CAR RHAB W/ECG: CPT

## 2022-05-31 PROCEDURE — 93797 PHYS/QHP OP CAR RHAB WO ECG: CPT | Mod: 59

## 2023-08-31 ENCOUNTER — LAB REQUISITION (OUTPATIENT)
Dept: LAB | Facility: CLINIC | Age: 78
End: 2023-08-31
Payer: MEDICARE

## 2023-08-31 DIAGNOSIS — H27.122 ANTERIOR DISLOCATION OF LENS, LEFT EYE: ICD-10-CM

## 2023-08-31 DIAGNOSIS — Z01.812 ENCOUNTER FOR PREPROCEDURAL LABORATORY EXAMINATION: ICD-10-CM

## 2023-09-01 LAB
ANION GAP SERPL CALCULATED.3IONS-SCNC: 12 MMOL/L (ref 7–15)
BASOPHILS # BLD AUTO: 0.1 10E3/UL (ref 0–0.2)
BASOPHILS NFR BLD AUTO: 1 %
BUN SERPL-MCNC: 17 MG/DL (ref 8–23)
CALCIUM SERPL-MCNC: 9.1 MG/DL (ref 8.8–10.2)
CHLORIDE SERPL-SCNC: 100 MMOL/L (ref 98–107)
CREAT SERPL-MCNC: 0.86 MG/DL (ref 0.51–0.95)
DEPRECATED HCO3 PLAS-SCNC: 25 MMOL/L (ref 22–29)
EOSINOPHIL # BLD AUTO: 0.1 10E3/UL (ref 0–0.7)
EOSINOPHIL NFR BLD AUTO: 2 %
ERYTHROCYTE [DISTWIDTH] IN BLOOD BY AUTOMATED COUNT: 14 % (ref 10–15)
GFR SERPL CREATININE-BSD FRML MDRD: 68 ML/MIN/1.73M2
GLUCOSE SERPL-MCNC: 433 MG/DL (ref 70–99)
HCT VFR BLD AUTO: 36.4 % (ref 35–47)
HGB BLD-MCNC: 11.6 G/DL (ref 11.7–15.7)
IMM GRANULOCYTES # BLD: 0.1 10E3/UL
IMM GRANULOCYTES NFR BLD: 1 %
LYMPHOCYTES # BLD AUTO: 2.7 10E3/UL (ref 0.8–5.3)
LYMPHOCYTES NFR BLD AUTO: 44 %
MCH RBC QN AUTO: 31.1 PG (ref 26.5–33)
MCHC RBC AUTO-ENTMCNC: 31.9 G/DL (ref 31.5–36.5)
MCV RBC AUTO: 98 FL (ref 78–100)
MONOCYTES # BLD AUTO: 0.6 10E3/UL (ref 0–1.3)
MONOCYTES NFR BLD AUTO: 9 %
NEUTROPHILS # BLD AUTO: 2.7 10E3/UL (ref 1.6–8.3)
NEUTROPHILS NFR BLD AUTO: 43 %
NRBC # BLD AUTO: 0 10E3/UL
NRBC BLD AUTO-RTO: 0 /100
PLATELET # BLD AUTO: 321 10E3/UL (ref 150–450)
POTASSIUM SERPL-SCNC: 4.5 MMOL/L (ref 3.4–5.3)
RBC # BLD AUTO: 3.73 10E6/UL (ref 3.8–5.2)
SODIUM SERPL-SCNC: 137 MMOL/L (ref 136–145)
WBC # BLD AUTO: 6.3 10E3/UL (ref 4–11)

## 2023-09-01 PROCEDURE — P9604 ONE-WAY ALLOW PRORATED TRIP: HCPCS | Performed by: REGISTERED NURSE

## 2023-09-01 PROCEDURE — 85025 COMPLETE CBC W/AUTO DIFF WBC: CPT | Performed by: REGISTERED NURSE

## 2023-09-01 PROCEDURE — 82310 ASSAY OF CALCIUM: CPT | Performed by: REGISTERED NURSE

## 2023-09-01 PROCEDURE — 36415 COLL VENOUS BLD VENIPUNCTURE: CPT | Performed by: REGISTERED NURSE
